# Patient Record
Sex: FEMALE | Race: WHITE | Employment: OTHER | URBAN - METROPOLITAN AREA
[De-identification: names, ages, dates, MRNs, and addresses within clinical notes are randomized per-mention and may not be internally consistent; named-entity substitution may affect disease eponyms.]

---

## 2017-01-03 RX ORDER — POTASSIUM CHLORIDE 750 MG/1
TABLET, EXTENDED RELEASE ORAL
Qty: 36 TABLET | Refills: 3 | Status: SHIPPED | OUTPATIENT
Start: 2017-01-03 | End: 2017-11-01

## 2017-01-03 RX ORDER — ATENOLOL AND CHLORTHALIDONE 100; 25 MG/1; MG/1
TABLET ORAL
Qty: 90 TABLET | Refills: 2 | Status: SHIPPED | OUTPATIENT
Start: 2017-01-03 | End: 2017-09-30

## 2017-01-25 NOTE — TELEPHONE ENCOUNTER
No upcoming appointments   Last HgbA1c on 11 12 2015--7.4  Last refill on Metformin #180 with 2 refills on 12 3 2015

## 2017-02-11 RX ORDER — LEVOTHYROXINE SODIUM 0.05 MG/1
TABLET ORAL
Qty: 90 TABLET | Refills: 3 | Status: SHIPPED | OUTPATIENT
Start: 2017-02-11 | End: 2018-02-02

## 2017-02-24 ENCOUNTER — PATIENT OUTREACH (OUTPATIENT)
Dept: FAMILY MEDICINE CLINIC | Facility: CLINIC | Age: 67
End: 2017-02-24

## 2017-03-14 ENCOUNTER — TELEPHONE (OUTPATIENT)
Dept: FAMILY MEDICINE CLINIC | Facility: CLINIC | Age: 67
End: 2017-03-14

## 2017-03-14 DIAGNOSIS — E03.9 HYPOTHYROIDISM, UNSPECIFIED TYPE: Primary | ICD-10-CM

## 2017-03-14 DIAGNOSIS — I10 ESSENTIAL HYPERTENSION, BENIGN: ICD-10-CM

## 2017-03-14 DIAGNOSIS — R73.9 HYPERGLYCEMIA: ICD-10-CM

## 2017-03-23 NOTE — TELEPHONE ENCOUNTER
Patient requests a 90 day supply on the pen needles   [facility]

## 2017-04-12 ENCOUNTER — OFFICE VISIT (OUTPATIENT)
Dept: NEUROLOGY | Facility: CLINIC | Age: 67
End: 2017-04-12

## 2017-04-12 ENCOUNTER — TELEPHONE (OUTPATIENT)
Dept: FAMILY MEDICINE CLINIC | Facility: CLINIC | Age: 67
End: 2017-04-12

## 2017-04-12 ENCOUNTER — NURSE ONLY (OUTPATIENT)
Dept: FAMILY MEDICINE CLINIC | Facility: CLINIC | Age: 67
End: 2017-04-12

## 2017-04-12 VITALS
BODY MASS INDEX: 47 KG/M2 | RESPIRATION RATE: 18 BRPM | DIASTOLIC BLOOD PRESSURE: 72 MMHG | WEIGHT: 293 LBS | HEART RATE: 86 BPM | SYSTOLIC BLOOD PRESSURE: 116 MMHG

## 2017-04-12 DIAGNOSIS — I10 ESSENTIAL HYPERTENSION, BENIGN: ICD-10-CM

## 2017-04-12 DIAGNOSIS — R42 DISEQUILIBRIUM: ICD-10-CM

## 2017-04-12 DIAGNOSIS — H83.8X3 SUPERIOR SEMICIRCULAR CANAL DEHISCENCE, BILATERAL: ICD-10-CM

## 2017-04-12 DIAGNOSIS — R42 VERTIGO: Primary | ICD-10-CM

## 2017-04-12 DIAGNOSIS — E03.9 HYPOTHYROIDISM, UNSPECIFIED TYPE: ICD-10-CM

## 2017-04-12 DIAGNOSIS — R73.9 HYPERGLYCEMIA: ICD-10-CM

## 2017-04-12 PROCEDURE — 85025 COMPLETE CBC W/AUTO DIFF WBC: CPT | Performed by: FAMILY MEDICINE

## 2017-04-12 PROCEDURE — 84443 ASSAY THYROID STIM HORMONE: CPT | Performed by: FAMILY MEDICINE

## 2017-04-12 PROCEDURE — 80053 COMPREHEN METABOLIC PANEL: CPT | Performed by: FAMILY MEDICINE

## 2017-04-12 PROCEDURE — 99204 OFFICE O/P NEW MOD 45 MIN: CPT | Performed by: OTHER

## 2017-04-12 PROCEDURE — 80061 LIPID PANEL: CPT | Performed by: FAMILY MEDICINE

## 2017-04-12 PROCEDURE — 36415 COLL VENOUS BLD VENIPUNCTURE: CPT | Performed by: FAMILY MEDICINE

## 2017-04-12 PROCEDURE — 82570 ASSAY OF URINE CREATININE: CPT | Performed by: FAMILY MEDICINE

## 2017-04-12 PROCEDURE — 83036 HEMOGLOBIN GLYCOSYLATED A1C: CPT | Performed by: FAMILY MEDICINE

## 2017-04-12 PROCEDURE — 82043 UR ALBUMIN QUANTITATIVE: CPT | Performed by: FAMILY MEDICINE

## 2017-04-12 RX ORDER — DIPHENHYDRAMINE HCL 25 MG
25 CAPSULE ORAL 2 TIMES DAILY
COMMUNITY
End: 2017-11-01

## 2017-04-12 RX ORDER — DOXEPIN HYDROCHLORIDE 50 MG/1
1 CAPSULE ORAL DAILY
COMMUNITY

## 2017-04-12 RX ORDER — ESCITALOPRAM OXALATE 10 MG/1
10 TABLET ORAL DAILY
Qty: 30 TABLET | Refills: 5 | Status: SHIPPED | OUTPATIENT
Start: 2017-04-12 | End: 2017-10-12

## 2017-04-12 RX ORDER — ATENOLOL AND CHLORTHALIDONE 100; 25 MG/1; MG/1
TABLET ORAL
Refills: 2 | COMMUNITY
Start: 2017-04-05 | End: 2017-11-13

## 2017-04-12 RX ORDER — DIAZEPAM 5 MG/1
10 TABLET ORAL EVERY 12 HOURS PRN
COMMUNITY
End: 2017-11-01

## 2017-04-12 RX ORDER — CYCLOBENZAPRINE HCL 10 MG
10 TABLET ORAL EVERY 8 HOURS PRN
COMMUNITY
End: 2017-08-17

## 2017-04-12 RX ORDER — DIAZEPAM 5 MG/1
TABLET ORAL
Qty: 2 TABLET | Refills: 0 | Status: SHIPPED | OUTPATIENT
Start: 2017-04-12 | End: 2017-11-01

## 2017-04-12 RX ORDER — LOPERAMIDE HYDROCHLORIDE 2 MG/1
2 CAPSULE ORAL 4 TIMES DAILY PRN
COMMUNITY
End: 2017-11-01

## 2017-04-12 NOTE — PATIENT INSTRUCTIONS
Refill policies:    • Allow 2 business days for refills; controlled substances may take longer.   • Contact your pharmacy at least 5 days prior to running out of medication and have them send an electronic request or submit request through the “request re insurance carrier to obtain pre-certification or prior authorization. Unfortunately, [facility] has seen an increase in denial of payment even though the procedure/test has been pre-certified.   You are strongly encouraged to contact your insurance carrier to v

## 2017-04-12 NOTE — TELEPHONE ENCOUNTER
Patient would like an order for a mammogram to have done at [facility]. Order in [address] [place] [address] Rd.

## 2017-04-14 ENCOUNTER — TELEPHONE (OUTPATIENT)
Dept: FAMILY MEDICINE CLINIC | Facility: CLINIC | Age: 67
End: 2017-04-14

## 2017-04-14 NOTE — TELEPHONE ENCOUNTER
----- Message from Ananth Vang DO sent at 4/13/2017  8:32 AM CDT -----  Can notify Piedad ayala, (herbie)  that her labs overall are not bad, her BS control is about the same as it was. Her cholesterol liver, kidney and thyroid look great.  She could stand to

## 2017-04-17 NOTE — TELEPHONE ENCOUNTER
Spoke with spouse, the neurologist ordered escitalopram for her. Apparently the med list was updated too. They saw the results and she's concerned re: the WBC. Both parents had some kind of blood disease.  They were also concerned re: the potential SE of th

## 2017-04-19 NOTE — TELEPHONE ENCOUNTER
Of all the medications in that class, escitalopram has the lest side effects and may in fact help her symptoms, the WBC count can be transient depending on several different factors.  But something we should monitor periodically

## 2017-04-19 NOTE — TELEPHONE ENCOUNTER
Patient notified and verbalized understanding. Patient expressing concern about reading ecotrin and escitalopram causing GI bleeding.   Advised patient she can be at an increased risk so to watch for any concerning stomach issues and let Dr Erin Mays know nikki

## 2017-05-09 ENCOUNTER — HOSPITAL ENCOUNTER (OUTPATIENT)
Dept: CT IMAGING | Age: 67
Discharge: HOME | End: 2017-05-09
Attending: Other
Payer: MEDICARE

## 2017-05-09 DIAGNOSIS — H83.8X3 SUPERIOR SEMICIRCULAR CANAL DEHISCENCE, BILATERAL: ICD-10-CM

## 2017-05-09 DIAGNOSIS — R42 VERTIGO: ICD-10-CM

## 2017-05-09 DIAGNOSIS — R42 DISEQUILIBRIUM: ICD-10-CM

## 2017-05-09 PROCEDURE — 70480 CT ORBIT/EAR/FOSSA W/O DYE: CPT | Performed by: OTHER

## 2017-05-10 ENCOUNTER — TELEPHONE (OUTPATIENT)
Dept: NEUROLOGY | Facility: CLINIC | Age: 67
End: 2017-05-10

## 2017-05-10 DIAGNOSIS — R42 DISEQUILIBRIUM: ICD-10-CM

## 2017-05-10 DIAGNOSIS — N95.0 POSTMENOPAUSAL BLEEDING: Primary | ICD-10-CM

## 2017-05-10 DIAGNOSIS — R42 VERTIGO: Primary | ICD-10-CM

## 2017-05-10 RX ORDER — MEDROXYPROGESTERONE ACETATE 10 MG/1
TABLET ORAL
Qty: 30 TABLET | Refills: 3 | Status: SHIPPED | OUTPATIENT
Start: 2017-05-10 | End: 2017-11-03

## 2017-05-10 NOTE — TELEPHONE ENCOUNTER
Spoke with Dr. Elisabet Price and he states MRA head to be without contrast.    Informed radiology and informed them the correct order will be placed into EP[facility].

## 2017-05-11 ENCOUNTER — HOSPITAL ENCOUNTER (OUTPATIENT)
Dept: MRI IMAGING | Age: 67
Discharge: HOME | End: 2017-05-11
Attending: Other
Payer: MEDICARE

## 2017-05-11 DIAGNOSIS — R42 VERTIGO: ICD-10-CM

## 2017-05-11 DIAGNOSIS — R42 DISEQUILIBRIUM: ICD-10-CM

## 2017-05-11 PROCEDURE — 70553 MRI BRAIN STEM W/O & W/DYE: CPT | Performed by: OTHER

## 2017-05-11 PROCEDURE — A9575 INJ GADOTERATE MEGLUMI 0.1ML: HCPCS | Performed by: OTHER

## 2017-05-11 PROCEDURE — 70544 MR ANGIOGRAPHY HEAD W/O DYE: CPT | Performed by: OTHER

## 2017-05-18 NOTE — TELEPHONE ENCOUNTER
Pt states she is out of needles for her insulin pen. Pt uses needles for Novolog and Lantus. She needs rx for at least 6 times a day.

## 2017-06-08 ENCOUNTER — TELEPHONE (OUTPATIENT)
Dept: FAMILY MEDICINE CLINIC | Facility: CLINIC | Age: 67
End: 2017-06-08

## 2017-06-08 NOTE — TELEPHONE ENCOUNTER
ALCIRA Crystal called to discuss medications. Pt is currently in the Medicare medication donut hole. Pt may be able to get her medications cheaper through prior authorization.  Diagnostic codes and previous medications tried provided to insurance for prior au

## 2017-06-09 ENCOUNTER — MED REC SCAN ONLY (OUTPATIENT)
Dept: FAMILY MEDICINE CLINIC | Facility: CLINIC | Age: 67
End: 2017-06-09

## 2017-08-16 ENCOUNTER — PATIENT OUTREACH (OUTPATIENT)
Dept: FAMILY MEDICINE CLINIC | Facility: CLINIC | Age: 67
End: 2017-08-16

## 2017-08-17 ENCOUNTER — TELEPHONE (OUTPATIENT)
Dept: FAMILY MEDICINE CLINIC | Facility: CLINIC | Age: 67
End: 2017-08-17

## 2017-08-17 ENCOUNTER — TELEPHONE (OUTPATIENT)
Dept: SURGERY | Facility: CLINIC | Age: 67
End: 2017-08-17

## 2017-08-17 RX ORDER — CYCLOBENZAPRINE HCL 10 MG
TABLET ORAL
Qty: 20 TABLET | Refills: 1 | Status: SHIPPED | OUTPATIENT
Start: 2017-08-17 | End: 2017-11-01

## 2017-08-17 NOTE — TELEPHONE ENCOUNTER
Detailed message left for pt/ on cell (ok per consent) with instructions to call with questions/concerns.

## 2017-08-17 NOTE — TELEPHONE ENCOUNTER
Informed  we cannot give wife any prescriptions without an evaluation. Patient has not been seen since 3/2015. I recommended he get RX from someone who has been seeing his wife more recently.  Asked if this is a medication the PCP can prescribe, I in

## 2017-08-17 NOTE — TELEPHONE ENCOUNTER
Pt's  called, they are taking a long car trip soon and he was wondering if Dr. Waqar Guerrero could prescribe Cyclobenzaprine, 10 mg tabs for pt for her back for the long trip. Pt had been prescribed this medication from another Dr previously.   [facility]

## 2017-08-18 ENCOUNTER — TELEPHONE (OUTPATIENT)
Dept: FAMILY MEDICINE CLINIC | Facility: CLINIC | Age: 67
End: 2017-08-18

## 2017-08-18 NOTE — TELEPHONE ENCOUNTER
Pharmacist [address] Medley notified and verbalized understanding. Patient notified also. Patient asking what is the concern. Discussed with patient both meds can increase level of serotonin in the blood which can cause dangerously high BP and HR.  Advised patien

## 2017-08-18 NOTE — TELEPHONE ENCOUNTER
Fax from [facility] needing approval to dispense due to taking Escitalopram 10 mg dialy, \"synergistic or additive toxicity with Cyclobenzaprine\"    Routed to Dr Kell Pinzon to advise

## 2017-08-18 NOTE — TELEPHONE ENCOUNTER
Cyclobenzaprine HCl 10 MG Oral Tab 20 tablet 1 8/17/2017     Sig: On po daily prn      [phone] Phone  Jose G in [address] Fax         Spouse ADILSON went to [facility] in [place] and they will not give him the medication due to a possible drug

## 2017-09-30 RX ORDER — ATENOLOL AND CHLORTHALIDONE 100; 25 MG/1; MG/1
TABLET ORAL
Qty: 30 TABLET | Refills: 0 | Status: SHIPPED | OUTPATIENT
Start: 2017-09-30 | End: 2017-10-27

## 2017-10-02 ENCOUNTER — TELEPHONE (OUTPATIENT)
Dept: FAMILY MEDICINE CLINIC | Facility: CLINIC | Age: 67
End: 2017-10-02

## 2017-10-02 NOTE — TELEPHONE ENCOUNTER
Atenolol-Chlorthalidone 100-25 MG Oral Tab needs to be for 90 days. Please sent to [facility] in [place].

## 2017-10-12 RX ORDER — ESCITALOPRAM OXALATE 10 MG/1
10 TABLET ORAL DAILY
Qty: 30 TABLET | Refills: 5 | Status: SHIPPED | OUTPATIENT
Start: 2017-10-12 | End: 2018-04-04

## 2017-10-12 NOTE — TELEPHONE ENCOUNTER
Pt called, needs refill on Escitalopram 10mg tabs, 90 day supply, pt takes one a day. These have been prescribed by another dr in the past but pt wants everything to go through one Dr.-Dr. Alan Gallardo. Svfewixt-Eywvazjwc-Tjatgd.   Please call pt at [phone]

## 2017-10-12 NOTE — TELEPHONE ENCOUNTER
Last OV 11/7/16, Future Appointments  Date Time Provider Lei Martini   11/1/2017 1:15 PM Iftikhar Davison, [facility] Mar[facility]   11/13/2017 1:00 PM Mehrdad Leal, [facility] EM[facility] Tierney Tinoco     Rx not previously prescribed by you.

## 2017-10-19 NOTE — TELEPHONE ENCOUNTER
Last OV 11/7/16, Future Appointments  Date Time Provider Lei Lianet   11/1/2017 1:15 PM Wendy Miguel [facility] EMG Genia Alas   11/13/2017 1:00 PM Autumn Leal, [facility] EM[facility] Genia Alas       Last rx given 4/25/17

## 2017-10-27 RX ORDER — ATENOLOL AND CHLORTHALIDONE 100; 25 MG/1; MG/1
1 TABLET ORAL
Qty: 90 TABLET | Refills: 3 | Status: SHIPPED | OUTPATIENT
Start: 2017-10-27 | End: 2018-10-22

## 2017-10-27 NOTE — TELEPHONE ENCOUNTER
Pt needs a refill of the   ATENOLOL-CHLORTHALIDONE 100-25 MG Oral Tab,   To [facility] in [place], for 90 days.

## 2017-10-27 NOTE — TELEPHONE ENCOUNTER
LOV  04/12/2017  Last refill  09/30/2017   #30 w. 0 RF  Future Appointments  Date Time Provider Lei Martini   11/1/2017 1:15 PM Lisset Bceker DO [facility] EM[facility] Genaro Love   11/13/2017 1:00 PM Hayley Leal, [facility] EM[facility] Genaro Love

## 2017-11-01 ENCOUNTER — OFFICE VISIT (OUTPATIENT)
Dept: FAMILY MEDICINE CLINIC | Facility: CLINIC | Age: 67
End: 2017-11-01

## 2017-11-01 VITALS
BODY MASS INDEX: 45.99 KG/M2 | DIASTOLIC BLOOD PRESSURE: 82 MMHG | HEIGHT: 67 IN | WEIGHT: 293 LBS | SYSTOLIC BLOOD PRESSURE: 120 MMHG | HEART RATE: 84 BPM | TEMPERATURE: 98 F | RESPIRATION RATE: 22 BRPM

## 2017-11-01 DIAGNOSIS — H25.89 OTHER AGE-RELATED CATARACT OF LEFT EYE: Primary | ICD-10-CM

## 2017-11-01 DIAGNOSIS — E03.9 HYPOTHYROIDISM, UNSPECIFIED TYPE: ICD-10-CM

## 2017-11-01 DIAGNOSIS — Z23 NEED FOR VACCINATION: ICD-10-CM

## 2017-11-01 DIAGNOSIS — I10 ESSENTIAL HYPERTENSION, BENIGN: ICD-10-CM

## 2017-11-01 PROCEDURE — G0008 ADMIN INFLUENZA VIRUS VAC: HCPCS | Performed by: FAMILY MEDICINE

## 2017-11-01 PROCEDURE — 90686 IIV4 VACC NO PRSV 0.5 ML IM: CPT | Performed by: FAMILY MEDICINE

## 2017-11-01 PROCEDURE — 99214 OFFICE O/P EST MOD 30 MIN: CPT | Performed by: FAMILY MEDICINE

## 2017-11-01 RX ORDER — KETOROLAC TROMETHAMINE 5 MG/ML
SOLUTION OPHTHALMIC
Refills: 1 | COMMUNITY
Start: 2017-10-04 | End: 2018-11-14

## 2017-11-01 RX ORDER — PREDNISOLONE ACETATE 10 MG/ML
SUSPENSION/ DROPS OPHTHALMIC
Refills: 1 | COMMUNITY
Start: 2017-10-04 | End: 2018-11-14 | Stop reason: ALTCHOICE

## 2017-11-01 RX ORDER — OFLOXACIN 3 MG/ML
SOLUTION/ DROPS OPHTHALMIC 4 TIMES DAILY
COMMUNITY
End: 2018-11-14

## 2017-11-01 NOTE — PROGRESS NOTES
Wilfredo Mane is a 69year old female who presents for a pre-operative physical exam. Patient is to have her right cataract extracted , to be done by Dr. Sobia Fenton at [facility]  on 12/5/17.       HPI:   Pt complains of decreasing vision  in the right e po daily days 1-10 of each month.  Disp: 30 tablet Rfl: 3   Insulin Pen Needle (BD PEN NEEDLE SHORT U/F) 31G X 8 MM Does not apply Misc For use with insulin pens Disp: 600 each Rfl: 3      Allergies:   Latex                      Past Medical History:   Diag stream  MUSCULOSKELETAL: denies back pain  NEURO: denies headaches  PSYCHE: denies depression or anxiety  HEMATOLOGIC: denies hx of anemia  ENDOCRINE: denies thyroid history  ALL/ASTHMA: denies hx of allergy or asthma    EXAM:   /82   Pulse 84   Temp

## 2017-11-03 DIAGNOSIS — E11.9 CONTROLLED TYPE 2 DIABETES MELLITUS WITHOUT COMPLICATION, WITH LONG-TERM CURRENT USE OF INSULIN (HCC): ICD-10-CM

## 2017-11-03 DIAGNOSIS — N95.0 POSTMENOPAUSAL BLEEDING: ICD-10-CM

## 2017-11-03 DIAGNOSIS — Z79.4 CONTROLLED TYPE 2 DIABETES MELLITUS WITHOUT COMPLICATION, WITH LONG-TERM CURRENT USE OF INSULIN (HCC): ICD-10-CM

## 2017-11-03 RX ORDER — INSULIN GLARGINE 100 [IU]/ML
INJECTION, SOLUTION SUBCUTANEOUS
Qty: 17 PEN | Refills: 0 | Status: SHIPPED | OUTPATIENT
Start: 2017-11-03 | End: 2018-11-14

## 2017-11-03 RX ORDER — INSULIN GLARGINE 100 [IU]/ML
INJECTION, SOLUTION SUBCUTANEOUS
Qty: 45 ML | Refills: 0 | Status: CANCELLED | OUTPATIENT
Start: 2017-11-03

## 2017-11-03 RX ORDER — INSULIN ASPART 100 [IU]/ML
INJECTION, SOLUTION INTRAVENOUS; SUBCUTANEOUS
Qty: 75 ML | Refills: 6 | Status: SHIPPED | OUTPATIENT
Start: 2017-11-03 | End: 2019-03-18

## 2017-11-03 RX ORDER — MEDROXYPROGESTERONE ACETATE 10 MG/1
TABLET ORAL
Qty: 30 TABLET | Refills: 3 | Status: SHIPPED | OUTPATIENT
Start: 2017-11-03

## 2017-11-03 NOTE — TELEPHONE ENCOUNTER
I called the Jose G in [facility] because it looks like she has refills of the medroxyprogesterone and per the pharmacist she does have 2 refills left on this medication.       solostar lrf 12/8/16- she needs a 3 month supply to get her through the end of th

## 2017-11-03 NOTE — TELEPHONE ENCOUNTER
PT CALLED TO ADV THAT THERE MUST OF BEEN SOME FORM OF MIX UP W/MED REFILL. THE WRONG INSULIN WAS SENT IN YESTERDAY-PT CALLED AND CANCELLED [address]. PT NEEDS BASAGLAR (PEN FORM)  SEND TO [facility].     PT ADV THAT PT'S INSURANCE WI

## 2017-11-03 NOTE — TELEPHONE ENCOUNTER
Called the pt to clarify what she needs      [employer] is not covering the solostar as of Jan 1 2018, so she spoke with Dr. Doug Vera about this at her visit this week and they decided on Basalgar as the drug that they will change to in 2018.     In the mean

## 2017-11-07 ENCOUNTER — TELEPHONE (OUTPATIENT)
Dept: FAMILY MEDICINE CLINIC | Facility: CLINIC | Age: 67
End: 2017-11-07

## 2017-11-07 NOTE — TELEPHONE ENCOUNTER
Dr. Agusto Lacy office called. They need us to fax the patient's history and physical to the hospital. The fax number is [phone]. They did not receive the signature page last time.

## 2017-11-13 ENCOUNTER — OFFICE VISIT (OUTPATIENT)
Dept: FAMILY MEDICINE CLINIC | Facility: CLINIC | Age: 67
End: 2017-11-13

## 2017-11-13 VITALS
TEMPERATURE: 99 F | WEIGHT: 293 LBS | BODY MASS INDEX: 45.99 KG/M2 | RESPIRATION RATE: 16 BRPM | SYSTOLIC BLOOD PRESSURE: 132 MMHG | DIASTOLIC BLOOD PRESSURE: 80 MMHG | HEIGHT: 67 IN | HEART RATE: 72 BPM | OXYGEN SATURATION: 98 %

## 2017-11-13 DIAGNOSIS — R42 VERTIGO: ICD-10-CM

## 2017-11-13 DIAGNOSIS — M81.0 AGE-RELATED OSTEOPOROSIS WITHOUT CURRENT PATHOLOGICAL FRACTURE: ICD-10-CM

## 2017-11-13 DIAGNOSIS — R42 DISEQUILIBRIUM: ICD-10-CM

## 2017-11-13 DIAGNOSIS — E66.9 DIABETES MELLITUS TYPE 2 IN OBESE (HCC): ICD-10-CM

## 2017-11-13 DIAGNOSIS — Z00.00 ENCOUNTER FOR ANNUAL HEALTH EXAMINATION: ICD-10-CM

## 2017-11-13 DIAGNOSIS — E11.69 DIABETES MELLITUS TYPE 2 IN OBESE (HCC): ICD-10-CM

## 2017-11-13 DIAGNOSIS — E03.9 HYPOTHYROIDISM, UNSPECIFIED TYPE: ICD-10-CM

## 2017-11-13 DIAGNOSIS — Z00.00 ROUTINE GENERAL MEDICAL EXAMINATION AT A HEALTH CARE FACILITY: Primary | ICD-10-CM

## 2017-11-13 DIAGNOSIS — Z12.31 VISIT FOR SCREENING MAMMOGRAM: ICD-10-CM

## 2017-11-13 DIAGNOSIS — I10 ESSENTIAL HYPERTENSION, BENIGN: ICD-10-CM

## 2017-11-13 DIAGNOSIS — E66.01 OBESITY, MORBID, BMI 40.0-49.9 (HCC): ICD-10-CM

## 2017-11-13 PROCEDURE — G0439 PPPS, SUBSEQ VISIT: HCPCS | Performed by: FAMILY MEDICINE

## 2017-11-13 NOTE — PROGRESS NOTES
HPI:   Waqar Queen is a 69year old female who presents for a Medicare Subsequent Annual Wellness visit (Pt already had Initial Annual Wellness).     Ted Huizar is here for follow and medicare wellness, she just had her first cataract done, and it went v LANTUS SOLOSTAR 100 UNIT/ML Subcutaneous Solution Pen-injector Inject 35 units subcutaneous every morning and 20 units in the evening as directed.    MedroxyPROGESTERone Acetate 10 MG Oral Tab Take 1 tab po daily days 1-10 of each month.   ketorolac trome 2/3/2015); and  (Bilateral, 4/14/2015). Her family history includes Carotid stenosis in her mother. SOCIAL HISTORY:   She  reports that she has never smoked.  She has never used smokeless tobacco. She reports that she does not drink alcohol or use drug and tongue normal; teeth and gums normal   Neck: Supple, symmetrical, trachea midline, no adenopathy;  thyroid: not enlarged, symmetric, no tenderness/mass/nodules; no carotid bruit or JVD   Back:   Symmetric, no curvature, ROM normal, no CVA tenderness pathological fracture   CONTINUE VIT D AND CALCIUM CARBONATE  Hypothyroidism, unspecified type   CONTINUE LEVOTHYROXINE DAILY  Obesity, morbid, BMI 40.0-49.9 ([facility] [address]   AGAIN WE DISCUSSED HER DIETARY INDISCRETION AND NEED FOR WEIGT LOSS  Vertigo   SEEING NEUR your medications?: Yes    Hearing Problems?: No     Functional Status     Hearing Problems?: No    Vision Problems? : Yes    Difficulty walking?: No    Difficulty dressing or bathing?: No    Problems with daily activities? : No    Memory Problems?: No years Colonoscopy,10 Years due on 04/20/2020 Update Health Maintenance if applicable    Flex Sigmoidoscopy Screen every 10 years No results found for this or any previous visit. No flowsheet data found.      Fecal Occult Blood Annually No results found for: Zoster  Not covered by Medicare Part B No vaccine history found This may be covered with your pharmacy  prescription benefits        SPECIFIC DISEASE MONITORING Internal Lab or Procedure External Lab or Procedure   Annual Monitoring of Persistent     Medic

## 2017-11-13 NOTE — PATIENT INSTRUCTIONS
Дмитрий Vázquez's SCREENING SCHEDULE   Tests on this list are recommended by your physician but may not be covered, or covered at this frequency, by your insurer. Please check with your insurance carrier before scheduling to verify coverage.    PREVENTAT previous visit.  Limited to patients who meet one of the following criteria:   • Men who are 73-68 years old and have smoked more than 100 cigarettes in their lifetime   • Anyone with a family history    Colorectal Cancer Screening  Covered up to Age 76 Please get this Mammogram regularly   Immunizations      Influenza  Covered Annually   Orders placed or performed in visit on 11/07/16  -FLU VACC PRSV FREE INC ANTIG   Orders placed or performed in visit on 11/12/15  -INFLUENZA VIRUS VACCINE, PRESERV FREE, available on it's website for anyone to review and print using their home computer and printer. (the forms are also available in [address])  [email or web address]. [email or web address]  This link also has information from the [address] regarding Advance Direc

## 2017-11-24 ENCOUNTER — TELEPHONE (OUTPATIENT)
Dept: FAMILY MEDICINE CLINIC | Facility: CLINIC | Age: 67
End: 2017-11-24

## 2017-11-24 NOTE — TELEPHONE ENCOUNTER
Pre-op from 11/2 printed. Need to have DS verify we can use this pre-op again, and sign we will need to fax.

## 2017-11-24 NOTE — TELEPHONE ENCOUNTER
Patient is wondering if the Pre-Op Clearance form has been completed and faxed back to [facility] and the surgeon. Please call patient back with any questions and to advise if form was faxed.

## 2017-11-24 NOTE — TELEPHONE ENCOUNTER
Called and spoke to patient notified her that we did send pre-op clearance on 11-7-2017. Asked patient notified if there is something else that she needs, she states no, she will verify with Dr. You Tirado if everything else is needed and call back if so.

## 2017-11-24 NOTE — TELEPHONE ENCOUNTER
Shayy Townsend from Dr Jovi Hdz' office states that they did not receive the pre-op clearance forms from Dr Ever Figueroa.  Please fax form to fax # [phone]: [place] Acuna

## 2017-11-30 ENCOUNTER — TELEPHONE (OUTPATIENT)
Dept: FAMILY MEDICINE CLINIC | Facility: CLINIC | Age: 67
End: 2017-11-30

## 2017-11-30 NOTE — TELEPHONE ENCOUNTER
Jigar Jessica, RN  Simi Stanford Nurse             Pt had cataract surgery 11/7/17. Pt needs updated H/P for second cataract surgery on 12/5/17. Surgeon is Dr. Candelario Schroeder. Surgery will be at [facility]. H/P to be faxed to Natanael Adorno at [phone].

## 2017-12-04 ENCOUNTER — TELEPHONE (OUTPATIENT)
Dept: FAMILY MEDICINE CLINIC | Facility: CLINIC | Age: 67
End: 2017-12-04

## 2017-12-04 NOTE — TELEPHONE ENCOUNTER
Pt called, is due to have surgery at [facility] tomorrow and was notified by hospital that her pre op is out of date.   Please call pt at [phone]

## 2017-12-04 NOTE — TELEPHONE ENCOUNTER
Spoke with patient who states she was told her H&P is out of date and needs a new preop clearance. 11/1/17 H&P was addended 11/25/17.     Patient provided number for the [facility] [phone] with Leisa Thompson at the [facility] who states [facility]

## 2017-12-05 ENCOUNTER — TELEPHONE (OUTPATIENT)
Dept: FAMILY MEDICINE CLINIC | Facility: CLINIC | Age: 67
End: 2017-12-05

## 2017-12-05 RX ORDER — FLUTICASONE PROPIONATE AND SALMETEROL 50; 100 UG/1; UG/1
POWDER RESPIRATORY (INHALATION)
Qty: 3 EACH | Refills: 3 | Status: SHIPPED | OUTPATIENT
Start: 2017-12-05 | End: 2018-12-06

## 2017-12-05 RX ORDER — RAMIPRIL 2.5 MG/1
CAPSULE ORAL
Qty: 90 CAPSULE | Refills: 1 | Status: SHIPPED | OUTPATIENT
Start: 2017-12-05 | End: 2018-06-06

## 2017-12-05 RX ORDER — POTASSIUM CHLORIDE 750 MG/1
TABLET, EXTENDED RELEASE ORAL
Qty: 36 TABLET | Refills: 3 | Status: SHIPPED | OUTPATIENT
Start: 2017-12-05 | End: 2018-11-14

## 2017-12-05 NOTE — TELEPHONE ENCOUNTER
Pt needs refill of Advair diskus 100/50; potassium chloride 100 MEQ; ramopril 2.5 mg  90 days to [facility].  Pls call if ?

## 2017-12-05 NOTE — TELEPHONE ENCOUNTER
Last refills -   Advair - 12/8/16 - #3 with 3 refills  KCL - 1/3/17 - #36 with 3 refills  Ramipril - 12/18/16 - #90 with 3 refills  Last b/p - 11/13/17 - 132/80

## 2017-12-05 NOTE — TELEPHONE ENCOUNTER
Last refills -   Advair - 12/8/16 - #3 with 3 refills  KCL - 1/3/17 - #36 with 3 refills  Ramipril - 12/18/16 - #90 with 3 refills  Last b/p - 11/13/17 - 132/80    See refill request encounter

## 2017-12-20 RX ORDER — POTASSIUM CHLORIDE 750 MG/1
TABLET, EXTENDED RELEASE ORAL
Qty: 36 TABLET | Refills: 6 | Status: SHIPPED | OUTPATIENT
Start: 2017-12-20 | End: 2019-02-25

## 2017-12-20 NOTE — TELEPHONE ENCOUNTER
LOV: 11/13/17  Last Refill:12/5/17  #36 3 RF    Last CMP: 4/12/17      No future appointments.     Leydi Vásquez, 12/20/17, 3:34 PM

## 2018-02-02 RX ORDER — LEVOTHYROXINE SODIUM 0.05 MG/1
TABLET ORAL
Qty: 90 TABLET | Refills: 3 | Status: SHIPPED | OUTPATIENT
Start: 2018-02-02 | End: 2019-01-28

## 2018-02-02 RX ORDER — INSULIN GLARGINE 100 [IU]/ML
INJECTION, SOLUTION SUBCUTANEOUS
Qty: 17 PEN | Refills: 3 | Status: SHIPPED | OUTPATIENT
Start: 2018-02-02 | End: 2018-10-22

## 2018-02-02 NOTE — TELEPHONE ENCOUNTER
Pt's Lantus is not covered with pt's insurance,   Pt's spouse thinks the [place] is covered. Needs a refill sent to [facility] [place].      Please return call to spouse at [phone]

## 2018-02-02 NOTE — TELEPHONE ENCOUNTER
LOV: 11/13/17  Last Refill:11/3/17  #17 0 RF    Kwame pended for your approval.  Spouse is requesting 90 day supply    Spouse mentioned that with [employer] [facility] [facility]- [facility] [address] [address] and spouse states he can't drive

## 2018-03-09 ENCOUNTER — TELEPHONE (OUTPATIENT)
Dept: FAMILY MEDICINE CLINIC | Facility: CLINIC | Age: 68
End: 2018-03-09

## 2018-04-04 RX ORDER — ESCITALOPRAM OXALATE 10 MG/1
TABLET ORAL
Qty: 30 TABLET | Refills: 5 | Status: SHIPPED | OUTPATIENT
Start: 2018-04-04 | End: 2018-04-05

## 2018-04-04 NOTE — TELEPHONE ENCOUNTER
Metformin Last refilled on 10/19/17 for # 180 with 1 refills  escitalopram last refilled on 10/12/17 for # 30 with 5 refills    Last A1C labs 7.5 on 4/12/17  Last seen on 11/13/17  No future appointments. Thank you.

## 2018-04-05 RX ORDER — ESCITALOPRAM OXALATE 10 MG/1
TABLET ORAL
Qty: 90 TABLET | Refills: 3 | Status: SHIPPED | OUTPATIENT
Start: 2018-04-05 | End: 2019-04-05

## 2018-04-05 NOTE — TELEPHONE ENCOUNTER
Patient requesting 90 day supply. Last refilled on 4/4/18 for # 30 with 5 refills  Last TSH labs 2.030 on 4/12/17  Last seen on 11/13/17  No future appointments. Advise. Thank you.

## 2018-05-03 ENCOUNTER — TELEPHONE (OUTPATIENT)
Dept: FAMILY MEDICINE CLINIC | Facility: CLINIC | Age: 68
End: 2018-05-03

## 2018-05-08 ENCOUNTER — MED REC SCAN ONLY (OUTPATIENT)
Dept: FAMILY MEDICINE CLINIC | Facility: CLINIC | Age: 68
End: 2018-05-08

## 2018-06-06 RX ORDER — RAMIPRIL 2.5 MG/1
CAPSULE ORAL
Qty: 90 CAPSULE | Refills: 3 | Status: SHIPPED | OUTPATIENT
Start: 2018-06-06 | End: 2019-06-17

## 2018-06-06 NOTE — TELEPHONE ENCOUNTER
Pt called and needs this script to be filled in [place]. She is visiting family out of town.   90 day supply    [facility]    Please return call to [phone]

## 2018-06-06 NOTE — TELEPHONE ENCOUNTER
LOV: 11/13/17  Last Refill:  12/5/17  #90 1 RF    Future Appointments  Date Time Provider Lei Martini   11/14/2018 9:15 AM Sikic, Sina Romberg, DO [facility] Zahida Valencia

## 2018-07-13 RX ORDER — PEN NEEDLE, DIABETIC 31 GX5/16"
NEEDLE, DISPOSABLE MISCELLANEOUS
Qty: 600 EACH | Refills: 3 | Status: SHIPPED | OUTPATIENT
Start: 2018-07-13 | End: 2019-08-26

## 2018-07-13 NOTE — TELEPHONE ENCOUNTER
Last refilled on 5/18/17 for # 600 with 3 refills  Last A1C 7.5 on 4/12/17  Last seen on 11/13/17  Future Appointments  Date Time Provider Lei Martini   11/14/2018 9:15 AM [facility] [place]        Thank you.

## 2018-09-06 ENCOUNTER — TELEPHONE (OUTPATIENT)
Dept: FAMILY MEDICINE CLINIC | Facility: CLINIC | Age: 68
End: 2018-09-06

## 2018-09-06 NOTE — TELEPHONE ENCOUNTER
Pt was in office today with  for f/u visit. Pt states she would like to do cologuard testing. Pt filled out and signed order form and it was sent to [facility].

## 2018-10-01 ENCOUNTER — IMMUNIZATION (OUTPATIENT)
Dept: FAMILY MEDICINE CLINIC | Facility: CLINIC | Age: 68
End: 2018-10-01
Payer: MEDICARE

## 2018-10-01 PROCEDURE — 90653 IIV ADJUVANT VACCINE IM: CPT | Performed by: FAMILY MEDICINE

## 2018-10-01 PROCEDURE — G0008 ADMIN INFLUENZA VIRUS VAC: HCPCS | Performed by: FAMILY MEDICINE

## 2018-10-09 DIAGNOSIS — N95.0 POSTMENOPAUSAL BLEEDING: ICD-10-CM

## 2018-10-09 RX ORDER — ESCITALOPRAM OXALATE 10 MG/1
TABLET ORAL
Qty: 90 TABLET | Refills: 1 | Status: SHIPPED | OUTPATIENT
Start: 2018-10-09 | End: 2019-04-05

## 2018-10-09 RX ORDER — MEDROXYPROGESTERONE ACETATE 10 MG/1
TABLET ORAL
Qty: 90 TABLET | Refills: 0 | Status: SHIPPED | OUTPATIENT
Start: 2018-10-09 | End: 2018-11-14

## 2018-10-09 NOTE — TELEPHONE ENCOUNTER
Patient called to make sure we received this prescription request. She wants 90 days for both prescriptions.

## 2018-10-09 NOTE — TELEPHONE ENCOUNTER
LOV: 11/13/17  Last Refill:   Escitalopram  #90 3 RF  Medroxyprogesterone  #30 3 RF    Future Appointments   Date Time Provider Lei Martini   11/14/2018  9:15 AM Maribel Leal DO [facility] Rudy Auguste

## 2018-10-18 ENCOUNTER — TELEPHONE (OUTPATIENT)
Dept: FAMILY MEDICINE CLINIC | Facility: CLINIC | Age: 68
End: 2018-10-18

## 2018-10-18 NOTE — TELEPHONE ENCOUNTER
Last refill on Metformin #180 with 1 refill on 4 4 2018   Last HgbA1c on 4 12 2017 7.5  Appointment on 11 14 2018

## 2018-10-18 NOTE — TELEPHONE ENCOUNTER
METFORMIN HCL 1000 MG Oral Tab please fill for 90 days.     [facility] [address] [facility], [phone], [phone]

## 2018-10-22 RX ORDER — ATENOLOL AND CHLORTHALIDONE 100; 25 MG/1; MG/1
TABLET ORAL
Qty: 90 TABLET | Refills: 3 | Status: SHIPPED | OUTPATIENT
Start: 2018-10-22 | End: 2019-10-22

## 2018-10-22 RX ORDER — INSULIN GLARGINE 100 [IU]/ML
INJECTION, SOLUTION SUBCUTANEOUS
Qty: 17 PEN | Refills: 3 | Status: SHIPPED | OUTPATIENT
Start: 2018-10-22 | End: 2019-01-14

## 2018-10-22 NOTE — TELEPHONE ENCOUNTER
Last refilled on 10/27/17 for # 90 with 3 refills  Last seen on 11/13/17, /80  Future Appointments   Date Time Provider Lei Martini   11/14/2018  9:15 AM Clayton Jurado [facility] BELINDA Ta        Thank you.

## 2018-10-22 NOTE — TELEPHONE ENCOUNTER
Last refilled on 2/2/18 for # 17 with 3 refills  Last A1C 7.5 on 4/12/17  Last seen on 11/7/17  Future Appointments   Date Time Provider Lei Martini   11/14/2018  9:15 AM DO LAUREN Wilkinson        Thank you.

## 2018-11-03 ENCOUNTER — TELEPHONE (OUTPATIENT)
Dept: FAMILY MEDICINE CLINIC | Facility: CLINIC | Age: 68
End: 2018-11-03

## 2018-11-14 ENCOUNTER — OFFICE VISIT (OUTPATIENT)
Dept: FAMILY MEDICINE CLINIC | Facility: CLINIC | Age: 68
End: 2018-11-14
Payer: MEDICARE

## 2018-11-14 VITALS
RESPIRATION RATE: 20 BRPM | WEIGHT: 288.38 LBS | HEART RATE: 64 BPM | HEIGHT: 67 IN | DIASTOLIC BLOOD PRESSURE: 68 MMHG | BODY MASS INDEX: 45.26 KG/M2 | SYSTOLIC BLOOD PRESSURE: 116 MMHG | TEMPERATURE: 97 F

## 2018-11-14 DIAGNOSIS — E03.9 HYPOTHYROIDISM, UNSPECIFIED TYPE: ICD-10-CM

## 2018-11-14 DIAGNOSIS — E66.9 DIABETES MELLITUS TYPE 2 IN OBESE (HCC): ICD-10-CM

## 2018-11-14 DIAGNOSIS — E11.69 DIABETES MELLITUS TYPE 2 IN OBESE (HCC): ICD-10-CM

## 2018-11-14 DIAGNOSIS — R42 VERTIGO: ICD-10-CM

## 2018-11-14 DIAGNOSIS — I10 ESSENTIAL HYPERTENSION, BENIGN: ICD-10-CM

## 2018-11-14 DIAGNOSIS — Z00.00 MEDICARE ANNUAL WELLNESS VISIT, SUBSEQUENT: Primary | ICD-10-CM

## 2018-11-14 DIAGNOSIS — Z00.00 ENCOUNTER FOR ANNUAL HEALTH EXAMINATION: ICD-10-CM

## 2018-11-14 DIAGNOSIS — Z12.31 SCREENING MAMMOGRAM, ENCOUNTER FOR: ICD-10-CM

## 2018-11-14 DIAGNOSIS — M81.0 AGE-RELATED OSTEOPOROSIS WITHOUT CURRENT PATHOLOGICAL FRACTURE: ICD-10-CM

## 2018-11-14 DIAGNOSIS — E66.01 MORBID OBESITY WITH BMI OF 45.0-49.9, ADULT (HCC): ICD-10-CM

## 2018-11-14 PROCEDURE — 85025 COMPLETE CBC W/AUTO DIFF WBC: CPT | Performed by: FAMILY MEDICINE

## 2018-11-14 PROCEDURE — 84439 ASSAY OF FREE THYROXINE: CPT | Performed by: FAMILY MEDICINE

## 2018-11-14 PROCEDURE — 36415 COLL VENOUS BLD VENIPUNCTURE: CPT | Performed by: FAMILY MEDICINE

## 2018-11-14 PROCEDURE — 80061 LIPID PANEL: CPT | Performed by: FAMILY MEDICINE

## 2018-11-14 PROCEDURE — 83036 HEMOGLOBIN GLYCOSYLATED A1C: CPT | Performed by: FAMILY MEDICINE

## 2018-11-14 PROCEDURE — 84443 ASSAY THYROID STIM HORMONE: CPT | Performed by: FAMILY MEDICINE

## 2018-11-14 PROCEDURE — G0439 PPPS, SUBSEQ VISIT: HCPCS | Performed by: FAMILY MEDICINE

## 2018-11-14 PROCEDURE — 80053 COMPREHEN METABOLIC PANEL: CPT | Performed by: FAMILY MEDICINE

## 2018-11-14 PROCEDURE — 82570 ASSAY OF URINE CREATININE: CPT | Performed by: FAMILY MEDICINE

## 2018-11-14 PROCEDURE — 82043 UR ALBUMIN QUANTITATIVE: CPT | Performed by: FAMILY MEDICINE

## 2018-11-14 RX ORDER — FLUTICASONE PROPIONATE 50 MCG
SPRAY, SUSPENSION (ML) NASAL NIGHTLY
COMMUNITY
End: 2018-12-01

## 2018-11-14 NOTE — PROGRESS NOTES
HPI:   Waqar Queen is a 77year old female who presents for a Medicare Subsequent Annual Wellness visit (Pt already had Initial Annual Wellness).     Beulah Dakota is here for her annual exam, she has lost weight since she was  here last she  Has some issu Health Care on file in [place]. She has never smoked tobacco.    CAGE Alcohol screening   Wilfredo Mane was screened for Alcohol abuse and had a score of 0 so is at low risk.     Patient Care Team: Patient Care Team:  Carlitos Viramontes DO as PCP - General TABLET BY MOUTH THREE TIMES WEEKLY   ADVAIR DISKUS 100-50 MCG/DOSE Inhalation Aerosol Powder, Breath Activated INHALE 1 PUFF BY MOUTH TWICE DAILY   NOVOLOG FLEXPEN 100 UNIT/ML Subcutaneous Solution Pen-injector INJECT 14 TO 17 UNITS AT BREAKFAST, 14 TO 17 Hearing Assessment  (Required for AWV/SWV)    Whispered Voice       Visual Acuity  Right Eye Visual Acuity: Corrected Right Eye Chart Acuity: 20/20   Left Eye Visual Acuity: Corrected Left Eye Chart Acuity: 20/20   Both Eyes Visual Acuity: Corrected Both E older 0.5 ml Prefilled syringe (29521) 11/01/2017   • HIGH DOSE FLU 65 YRS AND OLDER PRSV FREE SINGLE D (36138) FLU CLINIC 11/07/2016   • Influenza 10/02/2013   • Pneumococcal (Prevnar 13) 11/07/2016   • Pneumovax 23 03/01/2011   • TDAP 03/01/2011        A exercise. Return in 6 months (on 5/14/2019).      Chen Sanchez DO, 11/14/2018     General Health     In the past six months, have you lost more than 10 pounds without trying?: 1 - Yes  Has your appetite been poor?: No  How does the patient maintain a go age 72 and older at high risk There are no preventive care reminders to display for this patient. Update Health Maintenance if applicable    Chlamydia  Annually if high risk No results found for: CHLAMYDIA No flowsheet data found.     Screening Mammogram results found for: DIGOXIN, DIG, VALP No flowsheet data found. Diabetes      HgbA1C  Annually HGBA1C (%)   Date Value   06/06/2014 6.9 (H)     HgbA1C (%)   Date Value   04/12/2017 7.5 (H)       No flowsheet data found.     Creat/alb ratio  Annually Ma

## 2018-11-14 NOTE — PATIENT INSTRUCTIONS
Mahamed Vázquez's SCREENING SCHEDULE   Tests on this list are recommended by your physician but may not be covered, or covered at this frequency, by your insurer. Please check with your insurance carrier before scheduling to verify coverage.    KAMARI patients who meet one of the following criteria:   • Men who are 73-68 years old and have smoked more than 100 cigarettes in their lifetime   • Anyone with a family history    Colorectal Cancer Screening  Covered up to Age 76     Colonoscopy Screen   Cover Immunizations      Influenza  Covered Annually Orders placed or performed in visit on 11/07/16   • FLU VACC [address] ANTIG   Orders placed or performed in visit on 11/12/15   • INFLUENZA VIRUS VACCINE, PRESERV FREE, >=1YEARS OF AGE   Orders placed anyone to review and print using their home computer and printer. (the forms are also available in [address])  [email or web address]  This link also has information from the [address] [place] [address] regarding Advance Directives.

## 2018-11-15 ENCOUNTER — TELEPHONE (OUTPATIENT)
Dept: FAMILY MEDICINE CLINIC | Facility: CLINIC | Age: 68
End: 2018-11-15

## 2018-11-15 DIAGNOSIS — E11.69 DIABETES MELLITUS TYPE 2 IN OBESE (HCC): Primary | ICD-10-CM

## 2018-11-15 DIAGNOSIS — E66.9 DIABETES MELLITUS TYPE 2 IN OBESE (HCC): Primary | ICD-10-CM

## 2018-11-15 NOTE — TELEPHONE ENCOUNTER
----- Message from Kailey Nicole DO sent at 11/15/2018  9:59 AM CST -----  Notify Nuha Hopes  labs looked very good lipids were  Excellent kidney, liver function, blood sugar  Is actually better as well, so nice job , and lets see if maybe we can cut

## 2018-11-19 ENCOUNTER — TELEPHONE (OUTPATIENT)
Dept: FAMILY MEDICINE CLINIC | Facility: CLINIC | Age: 68
End: 2018-11-19

## 2018-11-19 NOTE — TELEPHONE ENCOUNTER
[facility].    Order form and letter sent to scanning.

## 2018-11-19 NOTE — TELEPHONE ENCOUNTER
Pt was advised of results- verbalized understanding    Pt states she noticed her white count was slightly high.  PT states her dad  of complications d/t a blood condition where his white count \"went out of control\"    Pt states she would like repeat c

## 2018-12-01 RX ORDER — FLUTICASONE PROPIONATE 50 MCG
1 SPRAY, SUSPENSION (ML) NASAL NIGHTLY
Qty: 1 BOTTLE | Refills: 0 | Status: SHIPPED | OUTPATIENT
Start: 2018-12-01 | End: 2019-03-11

## 2018-12-01 RX ORDER — FLUTICASONE PROPIONATE 50 MCG
SPRAY, SUSPENSION (ML) NASAL
Refills: 0 | OUTPATIENT
Start: 2018-12-01

## 2018-12-06 RX ORDER — FLUTICASONE PROPIONATE AND SALMETEROL 100; 50 UG/1; UG/1
POWDER RESPIRATORY (INHALATION)
Qty: 1 EACH | Refills: 3 | Status: SHIPPED | OUTPATIENT
Start: 2018-12-06 | End: 2019-03-11

## 2018-12-06 NOTE — TELEPHONE ENCOUNTER
PT ADV NEEDS REFILL OF     ADVAIR DISKUS 100-50 MCG/DOSE Inhalation Aerosol     (PT ADV SHE IS IN THE DONUT HOLE)    PT ONLY NEEDS THIS MONTH ONLY     [facility]    THANK YOU    (DO WE HAVE ANY SAMPLES)

## 2018-12-17 ENCOUNTER — PATIENT OUTREACH (OUTPATIENT)
Dept: FAMILY MEDICINE CLINIC | Facility: CLINIC | Age: 68
End: 2018-12-17

## 2019-01-14 RX ORDER — INSULIN GLARGINE 100 [IU]/ML
INJECTION, SOLUTION SUBCUTANEOUS
Qty: 18 PEN | Refills: 3 | Status: SHIPPED | OUTPATIENT
Start: 2019-01-14 | End: 2019-12-10

## 2019-01-14 NOTE — TELEPHONE ENCOUNTER
BASAGLAR KWIKPEN 100 UNIT/ML Subcutaneous Solution Pen-injector 17 pen 3 10/22/2018    Sig :  INJECT 35 UNITS UNDER THE SKIN EVERY MORNING AND 20 UNITS UNDER THE SKIN EVERY EVENING AS DIRECTED       PATIENT WOULD LIKE A 90 DAY SUPPLY.      Ady Mae IN [place]

## 2019-01-28 RX ORDER — LEVOTHYROXINE SODIUM 0.05 MG/1
TABLET ORAL
Qty: 90 TABLET | Refills: 3 | Status: SHIPPED | OUTPATIENT
Start: 2019-01-28 | End: 2020-01-17

## 2019-01-28 NOTE — TELEPHONE ENCOUNTER
Patient's  called about this request. He wants to make sure we fill it 90 days at a time. Please call back when meds have been called in.

## 2019-01-28 NOTE — TELEPHONE ENCOUNTER
LOV: 11/14/18   Last Refill:' 2/2/18 #90 3 RF    Last Labs: 11/14/18  Free T4 1.0  TSH 1.940    No future appointments.

## 2019-02-25 ENCOUNTER — TELEPHONE (OUTPATIENT)
Dept: FAMILY MEDICINE CLINIC | Facility: CLINIC | Age: 69
End: 2019-02-25

## 2019-02-25 RX ORDER — POTASSIUM CHLORIDE 750 MG/1
TABLET, EXTENDED RELEASE ORAL
Qty: 90 TABLET | Refills: 2 | Status: SHIPPED | OUTPATIENT
Start: 2019-02-25 | End: 2021-12-09

## 2019-02-25 NOTE — TELEPHONE ENCOUNTER
Spoke with spouse and advised that DS did fill script and it was sent ot pharmacy. E confirmation received at 2:13pm    Spouse was advised to contact pharmacy and make sure that they received script.   Verbalized understanding

## 2019-02-25 NOTE — TELEPHONE ENCOUNTER
LOV: 11/14/18  Last Refill:12/2017  #36 6 RF    Pt requests 90 day supply    No future appointments.

## 2019-02-25 NOTE — TELEPHONE ENCOUNTER
Pt's spouse called saying he went to  script for pt and Jose G advised that MD had denied the refill. Miri Gaytan a bit put out that this happened, said pt has been taking this for years.   Pls check and call Miri Gaytan at number given

## 2019-03-11 RX ORDER — FLUTICASONE PROPIONATE 50 MCG
SPRAY, SUSPENSION (ML) NASAL
Qty: 1 BOTTLE | Refills: 2 | Status: SHIPPED | OUTPATIENT
Start: 2019-03-11

## 2019-03-11 RX ORDER — FLUTICASONE PROPIONATE AND SALMETEROL 100; 50 UG/1; UG/1
POWDER RESPIRATORY (INHALATION)
Qty: 1 PACKAGE | Refills: 0 | Status: SHIPPED | OUTPATIENT
Start: 2019-03-11 | End: 2019-04-08

## 2019-03-11 RX ORDER — ALBUTEROL SULFATE 90 UG/1
2 AEROSOL, METERED RESPIRATORY (INHALATION) EVERY 6 HOURS PRN
Qty: 3 INHALER | Refills: 3 | Status: SHIPPED | OUTPATIENT
Start: 2019-03-11 | End: 2021-12-09

## 2019-03-11 NOTE — TELEPHONE ENCOUNTER
ALBUTEROL INHALER - ALBUTEROL SULFATE (PRO-AIR)    Fluticasone Propionate 50 MCG/ACT Nasal Suspension 1 Bottle 0 12/1/2018    Sig :  1 spray by Each Nare route nightly.      Route:   Each Nare                     fluticasone-salmeterol (ADVAIR DISKUS) 100-5

## 2019-03-11 NOTE — TELEPHONE ENCOUNTER
Advair and Fluticasone RF today    Albuterol Sulfate  LOV: 12/6/18   Last Refill:12/3/15 1 inhaler 6 Rf    No future appointments.

## 2019-03-13 RX ORDER — FLUTICASONE PROPIONATE AND SALMETEROL 100; 50 UG/1; UG/1
POWDER RESPIRATORY (INHALATION)
Qty: 1 EACH | Refills: 5 | Status: SHIPPED | OUTPATIENT
Start: 2019-03-13 | End: 2019-04-08

## 2019-03-18 DIAGNOSIS — E66.01 MORBID OBESITY WITH BMI OF 45.0-49.9, ADULT (HCC): ICD-10-CM

## 2019-03-18 DIAGNOSIS — E66.9 DIABETES MELLITUS TYPE 2 IN OBESE (HCC): ICD-10-CM

## 2019-03-18 DIAGNOSIS — I10 ESSENTIAL HYPERTENSION, BENIGN: Primary | ICD-10-CM

## 2019-03-18 DIAGNOSIS — E11.69 DIABETES MELLITUS TYPE 2 IN OBESE (HCC): ICD-10-CM

## 2019-03-18 NOTE — TELEPHONE ENCOUNTER
LOV: 11/14/18  Last Refill: 11/3/17 75ml 6 RF    No future appointments.     Last Labs: 11/14/18  6.9

## 2019-03-18 NOTE — TELEPHONE ENCOUNTER
Last refilled on 10/18/18 for # 180 with 0 refills  Last A1C 6.9 on 11/14/18  Last OV 11/14/18  No future appointments. Thank you.

## 2019-03-18 NOTE — TELEPHONE ENCOUNTER
Future orders placed with recall      PT advised    Future Appointments   Date Time Provider Lei Martini   5/6/2019 10:00 AM  [facility] [address] [facility] EM[facility] Elly Seg[place]

## 2019-03-18 NOTE — TELEPHONE ENCOUNTER
Pt needs a refill of the  NOVOLOG FLEXPEN 100 UNIT/ML Subcutaneous Solution Pen-injector  90 Days  [facility]

## 2019-04-05 RX ORDER — ESCITALOPRAM OXALATE 10 MG/1
TABLET ORAL
Qty: 90 TABLET | Refills: 3 | Status: SHIPPED | OUTPATIENT
Start: 2019-04-05 | End: 2020-03-28

## 2019-04-05 RX ORDER — ESCITALOPRAM OXALATE 10 MG/1
TABLET ORAL
Qty: 90 TABLET | Refills: 1 | Status: SHIPPED | OUTPATIENT
Start: 2019-04-05 | End: 2019-11-20

## 2019-04-05 NOTE — TELEPHONE ENCOUNTER
Last refilled on 4/5/18 for # 90 with 3 refills  Last OV 11/14/18  Future Appointments   Date Time Provider Lei Martini   5/6/2019 10:00 AM  [facility] [place] Fabian Hagen        Thank you.

## 2019-04-05 NOTE — TELEPHONE ENCOUNTER
Last refilled on 10/9/18 for # 90 with 1 refills  Last OV 11/14/18  Future Appointments   Date Time Provider Lei Martini   5/6/2019 10:00 AM  [facility] [address] EMG Genaro Love        Thank you.

## 2019-04-05 NOTE — TELEPHONE ENCOUNTER
Pt needs a refill of the  ESCITALOPRAM 10 MG Oral Tab [[id number]]    90 Days  [facility]

## 2019-04-08 RX ORDER — FLUTICASONE PROPIONATE AND SALMETEROL 100; 50 UG/1; UG/1
POWDER RESPIRATORY (INHALATION)
Qty: 14 EACH | Refills: 3 | Status: SHIPPED | OUTPATIENT
Start: 2019-04-08 | End: 2019-11-20

## 2019-04-08 NOTE — TELEPHONE ENCOUNTER
LOV: 11/14/18   Last Refill: 12/5/17 of Advair    Refill is generic Advair    Future Appointments   Date Time Provider Lei Martini   5/6/2019 10:00 AM  [facility] [place] [place]

## 2019-04-11 ENCOUNTER — TELEPHONE (OUTPATIENT)
Dept: FAMILY MEDICINE CLINIC | Facility: CLINIC | Age: 69
End: 2019-04-11

## 2019-04-24 ENCOUNTER — TELEPHONE (OUTPATIENT)
Dept: FAMILY MEDICINE CLINIC | Facility: CLINIC | Age: 69
End: 2019-04-24

## 2019-05-09 ENCOUNTER — TELEPHONE (OUTPATIENT)
Dept: FAMILY MEDICINE CLINIC | Facility: CLINIC | Age: 69
End: 2019-05-09

## 2019-05-09 NOTE — TELEPHONE ENCOUNTER
Letter mailed to patient reminding her she is due for labs.     Lab Frequency Next Occurrence   HEMOGLOBIN A1C Once 03/19/2019   MICROALB/CREAT RATIO, RANDOM URINE Once 70/98/1011   COMP METABOLIC PANEL (14) Once 55/99/6570   LIPID PANEL Once 05/18/2019

## 2019-05-15 RX ORDER — CYCLOBENZAPRINE HCL 10 MG
TABLET ORAL
Qty: 20 TABLET | Refills: 1 | Status: SHIPPED | OUTPATIENT
Start: 2019-05-15 | End: 2021-12-09

## 2019-05-15 NOTE — TELEPHONE ENCOUNTER
LOV: 11/14/18  Last Refill:8/17/17 #20 1 RF    Future Appointments   Date Time Provider Lei Martini   11/18/2019 10:00 AM Taty Leal [facility] Brittanie Gillespie

## 2019-05-15 NOTE — TELEPHONE ENCOUNTER
Spouse called, pt needs refill on Cyclobenzaprine, 10 mg. Hale Sandborn.  Please call spouse at [phone]

## 2019-06-17 ENCOUNTER — TELEPHONE (OUTPATIENT)
Dept: FAMILY MEDICINE CLINIC | Facility: CLINIC | Age: 69
End: 2019-06-17

## 2019-06-17 RX ORDER — RAMIPRIL 2.5 MG/1
CAPSULE ORAL
Qty: 90 CAPSULE | Refills: 3 | Status: SHIPPED | OUTPATIENT
Start: 2019-06-17 | End: 2020-03-10

## 2019-06-17 NOTE — TELEPHONE ENCOUNTER
Last refilled on 6/6/18 for # 90 with 3 rf. Last labs 11/14/18. Last seen on 11/14/18.    BP Readings from Last 3 Encounters:  11/14/18 : 116/68  11/13/17 : 132/80  11/01/17 : 120/82     Future Appointments   Date Time Provider Lei Martini   11/18/2

## 2019-08-26 RX ORDER — PEN NEEDLE, DIABETIC 31 GX5/16"
NEEDLE, DISPOSABLE MISCELLANEOUS
Qty: 600 EACH | Refills: 3 | Status: SHIPPED | OUTPATIENT
Start: 2019-08-26 | End: 2020-04-23

## 2019-08-26 NOTE — TELEPHONE ENCOUNTER
Last refilled on 7/13/18 for # 600 with 3 refills  Last OV 11/14/18  Future Appointments   Date Time Provider Lei Martini   9/4/2019 11:15 AM Herbert Chen DO [facility] EM[facility] [place]   11/18/2019 10:00 AM Charles Leal DO EMG [facility] Dinorah Salter        Thank

## 2019-09-04 ENCOUNTER — HOSPITAL ENCOUNTER (OUTPATIENT)
Dept: GENERAL RADIOLOGY | Age: 69
Discharge: HOME | End: 2019-09-04
Attending: FAMILY MEDICINE
Payer: MEDICARE

## 2019-09-04 ENCOUNTER — OFFICE VISIT (OUTPATIENT)
Dept: FAMILY MEDICINE CLINIC | Facility: CLINIC | Age: 69
End: 2019-09-04
Payer: MEDICARE

## 2019-09-04 VITALS
HEIGHT: 67 IN | RESPIRATION RATE: 20 BRPM | WEIGHT: 293 LBS | SYSTOLIC BLOOD PRESSURE: 118 MMHG | HEART RATE: 68 BPM | BODY MASS INDEX: 45.99 KG/M2 | TEMPERATURE: 98 F | DIASTOLIC BLOOD PRESSURE: 68 MMHG

## 2019-09-04 DIAGNOSIS — I10 ESSENTIAL HYPERTENSION, BENIGN: ICD-10-CM

## 2019-09-04 DIAGNOSIS — E66.9 DIABETES MELLITUS TYPE 2 IN OBESE (HCC): ICD-10-CM

## 2019-09-04 DIAGNOSIS — M48.061 SPINAL STENOSIS OF LUMBAR REGION, UNSPECIFIED WHETHER NEUROGENIC CLAUDICATION PRESENT: ICD-10-CM

## 2019-09-04 DIAGNOSIS — M54.41 CHRONIC BILATERAL LOW BACK PAIN WITH BILATERAL SCIATICA: ICD-10-CM

## 2019-09-04 DIAGNOSIS — M48.061 SPINAL STENOSIS OF LUMBAR REGION, UNSPECIFIED WHETHER NEUROGENIC CLAUDICATION PRESENT: Primary | ICD-10-CM

## 2019-09-04 DIAGNOSIS — E03.9 HYPOTHYROIDISM, UNSPECIFIED TYPE: ICD-10-CM

## 2019-09-04 DIAGNOSIS — E11.69 DIABETES MELLITUS TYPE 2 IN OBESE (HCC): ICD-10-CM

## 2019-09-04 DIAGNOSIS — M54.42 CHRONIC BILATERAL LOW BACK PAIN WITH BILATERAL SCIATICA: ICD-10-CM

## 2019-09-04 DIAGNOSIS — G89.29 CHRONIC BILATERAL LOW BACK PAIN WITH BILATERAL SCIATICA: ICD-10-CM

## 2019-09-04 DIAGNOSIS — E66.01 MORBID OBESITY WITH BMI OF 45.0-49.9, ADULT (HCC): ICD-10-CM

## 2019-09-04 PROCEDURE — 72110 X-RAY EXAM L-2 SPINE 4/>VWS: CPT | Performed by: FAMILY MEDICINE

## 2019-09-04 PROCEDURE — 99214 OFFICE O/P EST MOD 30 MIN: CPT | Performed by: FAMILY MEDICINE

## 2019-09-04 RX ORDER — TRAMADOL HYDROCHLORIDE 50 MG/1
50 TABLET ORAL EVERY 6 HOURS PRN
Qty: 30 TABLET | Refills: 0 | Status: SHIPPED | OUTPATIENT
Start: 2019-09-04 | End: 2019-09-29

## 2019-09-04 NOTE — PROGRESS NOTES
HPI:   Yamel Ojeda is a 68year old female who presents for recheck of her diabetes. Patient’s FBS have been 120-140. Last visit with ophthalmologist was last year. Pt has been checking her feet on a regular basis. Pt denies any tingling of the feet. Oral Cap TAKE ONE CAPSULE BY MOUTH ONCE EVERY DAY Disp: 90 capsule Rfl: 3   Cyclobenzaprine HCl 10 MG Oral Tab On po daily prn Disp: 20 tablet Rfl: 1   WIXELA INHUB 100-50 MCG/DOSE Inhalation Aerosol Powder, Breath Activated INHALE 1 PUFF BY MOUTH TWICE DA HFA) 108 (90 Base) MCG/ACT Inhalation Aero Soln Inhale 2 puffs into the lungs every 6 (six) hours as needed for Wheezing.  Disp: 3 Inhaler Rfl: 3      Past Medical History:   Diagnosis Date   • Essential hypertension, benign 1/15/2008   • Obesity, unspecifi nourished,in no apparent distress  SKIN: no rashes,no suspicious lesions  NECK: supple,no adenopathy,no bruits  LUNGS: clear to auscultation  CARDIO: RRR without murmur  GI: good BS's,no masses, HSM or tenderness  EXTREMITIES: no cyanosis, clubbing or jeremiah

## 2019-09-30 RX ORDER — TRAMADOL HYDROCHLORIDE 50 MG/1
TABLET ORAL
Qty: 30 TABLET | Refills: 1 | Status: SHIPPED | OUTPATIENT
Start: 2019-09-30 | End: 2021-12-09

## 2019-09-30 RX ORDER — FLUTICASONE PROPIONATE AND SALMETEROL 100; 50 UG/1; UG/1
POWDER RESPIRATORY (INHALATION)
Qty: 3 EACH | Refills: 3 | Status: SHIPPED | OUTPATIENT
Start: 2019-09-30 | End: 2020-03-18

## 2019-09-30 NOTE — TELEPHONE ENCOUNTER
Last refill on Tramadol #30 with 0 refills on 9 4 2019   Last refill on Gerardine Troncoso #14 with 3 refills on 4 8 2019  Last OV on 9 4 2019

## 2019-10-09 ENCOUNTER — TELEPHONE (OUTPATIENT)
Dept: FAMILY MEDICINE CLINIC | Facility: CLINIC | Age: 69
End: 2019-10-09

## 2019-10-22 RX ORDER — ATENOLOL AND CHLORTHALIDONE 100; 25 MG/1; MG/1
TABLET ORAL
Qty: 90 TABLET | Refills: 2 | Status: SHIPPED | OUTPATIENT
Start: 2019-10-22 | End: 2020-01-17

## 2019-11-20 ENCOUNTER — OFFICE VISIT (OUTPATIENT)
Dept: FAMILY MEDICINE CLINIC | Facility: CLINIC | Age: 69
End: 2019-11-20
Payer: MEDICARE

## 2019-11-20 VITALS
TEMPERATURE: 98 F | HEART RATE: 80 BPM | RESPIRATION RATE: 24 BRPM | BODY MASS INDEX: 45.99 KG/M2 | HEIGHT: 67 IN | DIASTOLIC BLOOD PRESSURE: 70 MMHG | SYSTOLIC BLOOD PRESSURE: 116 MMHG | WEIGHT: 293 LBS

## 2019-11-20 DIAGNOSIS — Z11.59 NEED FOR HEPATITIS C SCREENING TEST: ICD-10-CM

## 2019-11-20 DIAGNOSIS — Z12.31 VISIT FOR SCREENING MAMMOGRAM: ICD-10-CM

## 2019-11-20 DIAGNOSIS — E66.01 MORBID OBESITY WITH BMI OF 45.0-49.9, ADULT (HCC): ICD-10-CM

## 2019-11-20 DIAGNOSIS — E11.69 DIABETES MELLITUS TYPE 2 IN OBESE (HCC): ICD-10-CM

## 2019-11-20 DIAGNOSIS — Z23 FLU VACCINE NEED: ICD-10-CM

## 2019-11-20 DIAGNOSIS — G89.29 CHRONIC BILATERAL LOW BACK PAIN WITH LEFT-SIDED SCIATICA: ICD-10-CM

## 2019-11-20 DIAGNOSIS — I10 ESSENTIAL HYPERTENSION, BENIGN: ICD-10-CM

## 2019-11-20 DIAGNOSIS — M54.42 CHRONIC BILATERAL LOW BACK PAIN WITH LEFT-SIDED SCIATICA: ICD-10-CM

## 2019-11-20 DIAGNOSIS — Z00.00 MEDICARE ANNUAL WELLNESS VISIT, SUBSEQUENT: Primary | ICD-10-CM

## 2019-11-20 DIAGNOSIS — Z00.00 ENCOUNTER FOR MEDICARE ANNUAL WELLNESS EXAM: ICD-10-CM

## 2019-11-20 DIAGNOSIS — M81.0 AGE-RELATED OSTEOPOROSIS WITHOUT CURRENT PATHOLOGICAL FRACTURE: ICD-10-CM

## 2019-11-20 DIAGNOSIS — Z00.00 ENCOUNTER FOR ANNUAL HEALTH EXAMINATION: ICD-10-CM

## 2019-11-20 DIAGNOSIS — E66.9 DIABETES MELLITUS TYPE 2 IN OBESE (HCC): ICD-10-CM

## 2019-11-20 DIAGNOSIS — E03.9 HYPOTHYROIDISM, UNSPECIFIED TYPE: ICD-10-CM

## 2019-11-20 PROCEDURE — 90662 IIV NO PRSV INCREASED AG IM: CPT | Performed by: FAMILY MEDICINE

## 2019-11-20 PROCEDURE — G0008 ADMIN INFLUENZA VIRUS VAC: HCPCS | Performed by: FAMILY MEDICINE

## 2019-11-20 PROCEDURE — G0439 PPPS, SUBSEQ VISIT: HCPCS | Performed by: FAMILY MEDICINE

## 2019-11-20 NOTE — PATIENT INSTRUCTIONS
Kalyn Vázquez's SCREENING SCHEDULE   Tests on this list are recommended by your physician but may not be covered, or covered at this frequency, by your insurer. Please check with your insurance carrier before scheduling to verify coverage.    KAMARI patients who meet one of the following criteria:   • Men who are 73-68 years old and have smoked more than 100 cigarettes in their lifetime   • Anyone with a family history    Colorectal Cancer Screening  Covered up to Age 76     Colonoscopy Screen   Cover Immunizations      Influenza  Covered Annually Orders placed or performed in visit on 11/20/19   • FLU VACC HIGH DOSE PRSV FREE   Orders placed or performed in visit on 11/07/16   • FLU VACC PRSV FREE INC ANTIG   Orders placed or performed in visit on 11 of Advance Directives. It also has the State forms available on it's website for anyone to review and print using their home computer and printer. (the forms are also available in [address])  www. [employer] Media[employer]. org  This link also has information from the Am Welcome to Medicare, and non-screening if indicated for medical reasons Electrocardiogram date Routine EKG is not a screening covered service except at the Welcome to Medicare Visit    Abdominal aortic aneurysm screening (once between ages 73-68) IPPE only preventive care reminders to display for this patient. Chlamydia  Annually if high risk No results found for: CHLAMYDIA No flowsheet data found.     Screening Mammogram      Mammogram    Recommend Annually to at least age 76, and as needed after 76 Mamm Directives    [email or web address].no. org/publications/[email or web address]/personal_dec. pdf  An information packet, including necessary form from the [facility] website. http://[email or web address] id.[facility]. il.us/public/books/advin.htm  A link to the [place]

## 2019-11-20 NOTE — PROGRESS NOTES
HPI:   Richard Vasquez is a 68year old female who presents for a Medicare Subsequent Annual Wellness visit (Pt already had Initial Annual Wellness).     Qamarjustyn Monse is here for her [address], she has yet to get her MRI for her lower back pain and not seen Dr. Osborn Lob Kelly Butcher, DO as PCP - General (Family Practice)  Nuria Duncan DO as PCP - Yana Goetz MD as Consulting Physician (Jared Richardson)  Padmaja Albarran MD as Consulting Physician (NEUROLOGY)    Patient Active Problem List:     Essential hyperte BREAKFAST, 14 TO 17 UNITS AT LUNCH AND 12 TO 17 UNITS AT DINNER AND 23 UNITS FOR SNACKS  FLUTICASONE PROPIONATE 50 MCG/ACT Nasal Suspension, SHAKE LIQUID AND USE 1 SPRAY IN EACH NOSTRIL EVERY NIGHT  Albuterol Sulfate HFA (PROAIR HFA) 108 (90 Base) MCG/ACT otherwise  SKIN: denies any unusual skin lesions  EYES: denies blurred vision or double vision  HEENT: denies nasal congestion, sinus pain or ST  LUNGS: denies shortness of breath with exertion  CARDIOVASCULAR: denies chest pain on exertion  GI: denies abd adenopathy;  thyroid: not enlarged, symmetric, no tenderness/mass/nodules; no carotid bruit or JVD   Back:   Symmetric, no curvature, ROM limited, no CVA tenderness, has reproducible lumbar paraspin   Lungs:   Clear to auscultation bilaterally, respiration type   CONTINUE LEVOTHYROXINE  Essential hypertension, benign   CONINTUE RAMIPRIL 2.5 MG DAILY  Chronic bilateral low back pain with left-sided sciatica  -     MRI SPINE LUMBAR (CPT=72148);  Future   STILL HAS AN ORDER FOR PAIN CONSULT  Encounter for annual EKG - w/ Initial Preventative Physical Exam only, or if medically necessary Electrocardiogram date       Colorectal Cancer Screening      Colonoscopy Screen every 10 years Colonoscopy due on 04/20/2020 Update [facility] if applicable    Flex covered by Medicare Part B No vaccine history found This may be covered with your prescription benefits, but Medicare does not cover unless Medically needed    Zoster  Not covered by Medicare Part B No vaccine history found This may be covered with your ph

## 2019-12-10 RX ORDER — INSULIN GLARGINE 100 [IU]/ML
INJECTION, SOLUTION SUBCUTANEOUS
Qty: 18 PEN | Refills: 3 | Status: SHIPPED | OUTPATIENT
Start: 2019-12-10 | End: 2020-03-14

## 2019-12-10 NOTE — TELEPHONE ENCOUNTER
LOV: 11/20/19   Last Refill: 1/14/19 18 pen 3 Rf    No future appointments.     Last A1C: 6.9 11/14/18

## 2019-12-10 NOTE — TELEPHONE ENCOUNTER
BASAGLAR KWIKPEN 100 UNIT/ML Subcutaneous Solution Pen-injector   Pt needs 90 day supply script Would like it sent to [facility] in [place]

## 2019-12-27 NOTE — TELEPHONE ENCOUNTER
Spoke with patient. She does need a refill on the Metformin. If possible she would like the 500mg tablets instead of the 1000mg. The 1000mg are hard to cut.

## 2020-01-17 ENCOUNTER — TELEPHONE (OUTPATIENT)
Dept: FAMILY MEDICINE CLINIC | Facility: CLINIC | Age: 70
End: 2020-01-17

## 2020-01-17 RX ORDER — LEVOTHYROXINE SODIUM 0.05 MG/1
50 TABLET ORAL
Qty: 90 TABLET | Refills: 0 | Status: SHIPPED | OUTPATIENT
Start: 2020-01-17 | End: 2020-04-23

## 2020-01-17 RX ORDER — LEVOTHYROXINE SODIUM 0.05 MG/1
50 TABLET ORAL
Qty: 90 TABLET | Refills: 0 | Status: SHIPPED | OUTPATIENT
Start: 2020-01-17 | End: 2020-01-17

## 2020-01-17 RX ORDER — ATENOLOL AND CHLORTHALIDONE 100; 25 MG/1; MG/1
1 TABLET ORAL
Qty: 90 TABLET | Refills: 2 | Status: SHIPPED | OUTPATIENT
Start: 2020-01-17 | End: 2020-01-17

## 2020-01-17 RX ORDER — ATENOLOL AND CHLORTHALIDONE 100; 25 MG/1; MG/1
1 TABLET ORAL
Qty: 90 TABLET | Refills: 2 | Status: SHIPPED | OUTPATIENT
Start: 2020-01-17 | End: 2020-10-15

## 2020-01-17 NOTE — TELEPHONE ENCOUNTER
LEVOTHYROXINE SODIUM 50 MCG Oral Tab    ATENOLOL-CHLORTHALIDONE 100-25 MG Oral Tab    [id number] - [address], [phone], [phone]

## 2020-01-17 NOTE — TELEPHONE ENCOUNTER
Phone call from patient's . States that meds were sent incorrectly to [facility]. Need to be sent to [facility]. Meds sent to [facility]. Called [facility] - cancelled scripts.

## 2020-01-17 NOTE — TELEPHONE ENCOUNTER
Last refill on Levothyroxine #90 with 3 refills on 1 28 2019   Last refill on Atenolol #90 with 2 refills on 10 22 2019   Last TSH on 11 15 2018   No future appointments scheduled  Last OV on 11 20 2019   Levothyroxine does not meet protocol

## 2020-01-17 NOTE — TELEPHONE ENCOUNTER
SPOUSE CALLED AND ADV THAT HE REQUESTED REFILLS EARLIER TODAY AND NEVER GOT A CALL. SPOUSE ADV THAT MEDS NEED TO GO TO [facility].     PT WILL BE OUT OF MEDS OVER THE WEEKEND  (ATENOLOL)    PLEASE SEND TO [facility]     PLEASE CALL SPOUSE AND LET HIM [address]

## 2020-03-10 ENCOUNTER — TELEPHONE (OUTPATIENT)
Dept: FAMILY MEDICINE CLINIC | Facility: CLINIC | Age: 70
End: 2020-03-10

## 2020-03-10 RX ORDER — RAMIPRIL 2.5 MG/1
CAPSULE ORAL
Qty: 90 CAPSULE | Refills: 3 | Status: SHIPPED | OUTPATIENT
Start: 2020-03-10 | End: 2021-03-08

## 2020-03-10 NOTE — TELEPHONE ENCOUNTER
ramipril 2.5 MG Oral Cap  [facility] [address] [address] [address], [phone]

## 2020-03-14 RX ORDER — INSULIN GLARGINE 100 [IU]/ML
INJECTION, SOLUTION SUBCUTANEOUS
Qty: 18 PEN | Refills: 3 | Status: SHIPPED | OUTPATIENT
Start: 2020-03-14 | End: 2020-03-18

## 2020-03-14 NOTE — TELEPHONE ENCOUNTER
Pt needs a refill of the  BASAGLAR KWIKPEN 100 UNIT/ML Subcutaneous Solution Pen-injector  90 Days  [facility] [place]

## 2020-03-14 NOTE — TELEPHONE ENCOUNTER
No refill protocol for this medication. Last refill: 12/10/2019 #18 pen with 3 refills  Last Visit: 11/20/2019  Next Visit: No future appointments. Forward to Dr. Stanley Gagnon please advise on refills. Thanks.

## 2020-03-18 RX ORDER — FLUTICASONE PROPIONATE AND SALMETEROL 100; 50 UG/1; UG/1
POWDER RESPIRATORY (INHALATION)
Qty: 3 EACH | Refills: 3 | Status: SHIPPED | OUTPATIENT
Start: 2020-03-18 | End: 2020-07-14

## 2020-03-18 RX ORDER — INSULIN GLARGINE 100 [IU]/ML
INJECTION, SOLUTION SUBCUTANEOUS
Qty: 30 PEN | Refills: 3 | Status: SHIPPED | OUTPATIENT
Start: 2020-03-18 | End: 2020-07-06

## 2020-03-18 NOTE — TELEPHONE ENCOUNTER
Pt would like script for Tessalon- just like     Pt needs 90 day supply for Wixela and Basaglar pens    Also has cough and would like what  got    Please send Tessalon

## 2020-03-19 ENCOUNTER — TELEPHONE (OUTPATIENT)
Dept: FAMILY MEDICINE CLINIC | Facility: CLINIC | Age: 70
End: 2020-03-19

## 2020-03-19 RX ORDER — BENZONATATE 200 MG/1
200 CAPSULE ORAL 3 TIMES DAILY PRN
Qty: 21 CAPSULE | Refills: 0 | Status: SHIPPED | OUTPATIENT
Start: 2020-03-19 | End: 2020-03-28

## 2020-03-19 NOTE — TELEPHONE ENCOUNTER
Routing to provider- pt needs order of Tessalon.     Please send script- pt  is also on Tessalon- [facility] in [place] back to [address] [facility]-

## 2020-03-19 NOTE — TELEPHONE ENCOUNTER
SPOUSE CALLED AND ADV THAT HE NEVER GOT A CALL BACK FROM YESTERDAY ON MED REQUEST. ADV PT THAT 2 OF THE REQUESTS WERE SENT IN. THE COUGH PILLS (TESSALON) BENZONATATE 200 MG WAS NOT SENT IN.     PLEASE CALL AND ADV      [facility]

## 2020-03-28 DIAGNOSIS — N95.0 POSTMENOPAUSAL BLEEDING: ICD-10-CM

## 2020-03-28 RX ORDER — BENZONATATE 200 MG/1
200 CAPSULE ORAL 3 TIMES DAILY PRN
Qty: 21 CAPSULE | Refills: 0 | Status: SHIPPED | OUTPATIENT
Start: 2020-03-28 | End: 2020-04-07

## 2020-03-28 RX ORDER — ESCITALOPRAM OXALATE 10 MG/1
TABLET ORAL
Qty: 90 TABLET | Refills: 3 | Status: SHIPPED | OUTPATIENT
Start: 2020-03-28 | End: 2021-03-24

## 2020-03-28 RX ORDER — MEDROXYPROGESTERONE ACETATE 10 MG/1
TABLET ORAL
Qty: 90 TABLET | Refills: 0 | Status: SHIPPED | OUTPATIENT
Start: 2020-03-28 | End: 2021-04-09

## 2020-03-28 NOTE — TELEPHONE ENCOUNTER
Last OV 11/20/19  Escitalopram last refilled 4/5/19  #90  3 refills      Benzonatate last refilled 19/2020  #21 0 refills

## 2020-03-28 NOTE — TELEPHONE ENCOUNTER
No refill protocol for this medication. Last refill: 10/09/2018 #90 with 0 refills  Last Visit: 11/20/2019  Next Visit: No future appointments. Forward to Dr. Sharyle Bjork please advise on refills. Thanks.

## 2020-03-28 NOTE — TELEPHONE ENCOUNTER
Pt's  called Pt still has a cough and is wanting to get a refill of the cough medicine(BENZONATATE )   He states cough is getting better not completely gone but the medication is working.      Also needs refills of    ESCITALOPRAM 10 MG Oral Tab 90 d

## 2020-03-28 NOTE — TELEPHONE ENCOUNTER
MedroxyPROGESTERone Acetate 10 MG Oral Tab      Pt would like sent to [address] [facility] [place].

## 2020-04-07 RX ORDER — BENZONATATE 200 MG/1
CAPSULE ORAL
Qty: 21 CAPSULE | Refills: 0 | Status: SHIPPED | OUTPATIENT
Start: 2020-04-07 | End: 2021-12-09 | Stop reason: ALTCHOICE

## 2020-04-07 NOTE — TELEPHONE ENCOUNTER
Refill request per protocol: none  Last refilled 3/28/20 #21  LOV 11/20/19  No future appt  Routed to PCP to advise

## 2020-04-07 NOTE — TELEPHONE ENCOUNTER
Spoke to , Yamilet Mendiola, it was a request she had put in. He stated she is still coughing, not as bad as before. Her sleep schedule is off. Stated she is sleeping more during the day rather than at night. Was sleeping when I called.    No fever or other symp

## 2020-04-23 ENCOUNTER — TELEPHONE (OUTPATIENT)
Dept: FAMILY MEDICINE CLINIC | Facility: CLINIC | Age: 70
End: 2020-04-23

## 2020-04-23 RX ORDER — LEVOTHYROXINE SODIUM 0.05 MG/1
TABLET ORAL
Qty: 90 TABLET | Refills: 0 | Status: SHIPPED | OUTPATIENT
Start: 2020-04-23 | End: 2020-07-21

## 2020-04-23 NOTE — TELEPHONE ENCOUNTER
Asking for refill BD PEN NEEDLE SHORT U/F 31G X 8 MM    Levothyroxine Sodium 50 MCG Oral Tab     Need 90 day  [facility]

## 2020-04-23 NOTE — TELEPHONE ENCOUNTER
LOV: 11/20/19  Last Refill: 1/17/20 #90 0 RF    No future appointments.     Lab Results   Component Value Date    T4F 1.3 11/14/2018    TSH 1.940 11/14/2018

## 2020-05-29 ENCOUNTER — TELEPHONE (OUTPATIENT)
Dept: FAMILY MEDICINE CLINIC | Facility: CLINIC | Age: 70
End: 2020-05-29

## 2020-06-17 ENCOUNTER — TELEPHONE (OUTPATIENT)
Dept: FAMILY MEDICINE CLINIC | Facility: CLINIC | Age: 70
End: 2020-06-17

## 2020-06-17 NOTE — TELEPHONE ENCOUNTER
metFORMIN HCl 500 MG Oral Tab    90 day supply     Pt would like sent [address], [address], [phone]

## 2020-07-06 RX ORDER — INSULIN GLARGINE 100 [IU]/ML
INJECTION, SOLUTION SUBCUTANEOUS
Qty: 30 PEN | Refills: 3 | Status: SHIPPED | OUTPATIENT
Start: 2020-07-06 | End: 2020-12-10

## 2020-07-06 NOTE — TELEPHONE ENCOUNTER
LOV: 11/20/19   Last Refill: 03/18/20 #30 3RF    Future Appointments   Date Time Provider Lei Lianet   11/23/2020  8:20 AM Laura Leal DO [facility] Hiram Niece     Last A1c value was 6.9% done 11/14/2018.

## 2020-07-14 NOTE — TELEPHONE ENCOUNTER
Asthma & COPD Medication Protocol Failed7/14 1:37 PM   Asthma Action Score greater than or equal to 20    Appointment in past 6 or next 3 months     AAP/ACT given in last 12 months     Last OV on 11 20 2019  Appointment on 11 23 2020

## 2020-07-21 ENCOUNTER — TELEPHONE (OUTPATIENT)
Dept: FAMILY MEDICINE CLINIC | Facility: CLINIC | Age: 70
End: 2020-07-21

## 2020-07-21 RX ORDER — LEVOTHYROXINE SODIUM 0.05 MG/1
50 TABLET ORAL DAILY
Qty: 90 TABLET | Refills: 0 | Status: CANCELLED | OUTPATIENT
Start: 2020-07-21

## 2020-07-21 RX ORDER — LEVOTHYROXINE SODIUM 0.05 MG/1
TABLET ORAL
Qty: 90 TABLET | Refills: 2 | Status: SHIPPED | OUTPATIENT
Start: 2020-07-21 | End: 2021-04-09

## 2020-07-21 NOTE — TELEPHONE ENCOUNTER
Thyroid Supplements Protocol Failed7/21 11:08 AM   TSH test in past 12 months    TSH value between 0.350 and 5.500 IU/ml    Appointment in past 12 or next 3 months

## 2020-07-21 NOTE — TELEPHONE ENCOUNTER
PT NEEDS 90 DAYS SUPPLY PLEASE.     PT ALSO ADV THAT SHE NEEDS 90 DAY SUPPLY OF     SOLUSTAR     AND     MedroxyPROGESTERone Acetate 10 MG Oral Tab      PLEASE MAKE [address] Detail Level: Detailed

## 2020-07-31 ENCOUNTER — TELEPHONE (OUTPATIENT)
Dept: FAMILY MEDICINE CLINIC | Facility: CLINIC | Age: 70
End: 2020-07-31

## 2020-08-03 ENCOUNTER — TELEPHONE (OUTPATIENT)
Dept: FAMILY MEDICINE CLINIC | Facility: CLINIC | Age: 70
End: 2020-08-03

## 2020-08-03 RX ORDER — BLOOD SUGAR DIAGNOSTIC
STRIP MISCELLANEOUS
Qty: 200 STRIP | Refills: 0 | Status: SHIPPED | OUTPATIENT
Start: 2020-08-03 | End: 2021-08-03

## 2020-08-03 RX ORDER — LANCETS 33 GAUGE
EACH MISCELLANEOUS
Qty: 200 EACH | Refills: 0 | Status: SHIPPED | OUTPATIENT
Start: 2020-08-03

## 2020-08-03 NOTE — TELEPHONE ENCOUNTER
Srinivasa Badillo had a cortisone injection in knee. Was advised to check his BG. Went out to buy a glucometer. Then handed Heaven Hernandez the meter and had her check her BG as she hasn't checked it in a while.  at that point.   Looking to get a CGM so she does not have

## 2020-08-03 NOTE — TELEPHONE ENCOUNTER
Given her history I am SURE her readings are elevated and the longer she waits the longer it will take to get, so lets not wait too long

## 2020-08-03 NOTE — TELEPHONE ENCOUNTER
Spouse called, states pt will not qualify for diabetic stuff under Medicare unless she self tests for a period of time.   They are waiting for a call back from the diabetic specialist who wants pt to do something but yet Medicare said pt won't qualify so sp

## 2020-08-05 ENCOUNTER — TELEPHONE (OUTPATIENT)
Dept: FAMILY MEDICINE CLINIC | Facility: CLINIC | Age: 70
End: 2020-08-05

## 2020-08-05 RX ORDER — BLOOD-GLUCOSE METER
EACH MISCELLANEOUS
Qty: 1 KIT | Refills: 0 | Status: SHIPPED | OUTPATIENT
Start: 2020-08-05

## 2020-08-05 NOTE — TELEPHONE ENCOUNTER
Pt's spouse called back to let us know that his device is a OneTouch Ultra. His device is from 1997.    Please return call to [phone]

## 2020-08-05 NOTE — TELEPHONE ENCOUNTER
Donnella Mortimer states the wrong strips were ordered. Advised we ordered the strips with the name that was given. Will go home and double check the glucometer and call back with brand name.

## 2020-08-05 NOTE — TELEPHONE ENCOUNTER
Nneka Ayala is telling him they did not receive this. Clarification request was faxed to them yesterday. Called and spoke with Irma. States they did not receive fax. Confirmed fax # of [phone]. Will fax again.

## 2020-08-05 NOTE — TELEPHONE ENCOUNTER
Spouse called, said he spoke with pharmacy and they claim to have sent us a fax asking for more information.  This would be for pt's diabetic meds and strips

## 2020-08-14 RX ORDER — POTASSIUM CHLORIDE 750 MG/1
TABLET, FILM COATED, EXTENDED RELEASE ORAL
Qty: 38 TABLET | Refills: 5 | Status: SHIPPED | OUTPATIENT
Start: 2020-08-14 | End: 2021-12-09

## 2020-08-14 NOTE — TELEPHONE ENCOUNTER
No refill protocol for this medication. Last refill: 2/25/2019 #90 with 2 refills  Last Visit: 11/20/2019  Next Visit:   Future Appointments   Date Time Provider Lei Martini   11/23/2020  8:20 AM Arabella Leal, [facility] BELINDA Montano         Forward to Dr. Vibha Ochoa please advise on refills. Thanks.

## 2020-10-15 RX ORDER — ATENOLOL AND CHLORTHALIDONE TABLET 100; 25 MG/1; MG/1
1 TABLET ORAL
Qty: 90 TABLET | Refills: 2 | Status: SHIPPED | OUTPATIENT
Start: 2020-10-15 | End: 2021-07-12

## 2020-10-15 NOTE — TELEPHONE ENCOUNTER
Last refilled 1/17/20 #90 with 2 RF  LOV with DS 11/20/19  Future appt with DS 11/23/20  Medication failed protocol due to the following reasons:  Last CMP 11/14/18  Future blood orders placed, fyi  Routed to PCP to advise     Hypertension Medications Protocol Rbnmwu13/15/2020 10:18 AM   CMP or BMP in past 12 months

## 2020-12-10 RX ORDER — INSULIN GLARGINE 100 [IU]/ML
INJECTION, SOLUTION SUBCUTANEOUS
Qty: 45 ML | Refills: 0 | Status: SHIPPED | OUTPATIENT
Start: 2020-12-10 | End: 2020-12-10

## 2020-12-10 RX ORDER — INSULIN GLARGINE 100 [IU]/ML
INJECTION, SOLUTION SUBCUTANEOUS
Qty: 45 ML | Refills: 2 | Status: SHIPPED | OUTPATIENT
Start: 2020-12-10 | End: 2021-12-01

## 2020-12-21 RX ORDER — INSULIN ASPART 100 [IU]/ML
INJECTION, SOLUTION INTRAVENOUS; SUBCUTANEOUS
Qty: 75 ML | Refills: 6 | Status: SHIPPED | OUTPATIENT
Start: 2020-12-21 | End: 2020-12-29

## 2020-12-21 NOTE — TELEPHONE ENCOUNTER
Insulin Aspart Pen (NOVOLOG FLEXPEN) 100 UNIT/ML Subcutaneous Solution Pen-injection     This is before snacks and meals --Base number is 14      Pt would like a 90 supply and would like script sent to   [address], [address] [place] [address] [address]

## 2020-12-21 NOTE — TELEPHONE ENCOUNTER
LOV: 11/20/19   Last Refill:3/18/19 75ml 6 RF    No future appointments. Last A1c value was 6.9% done 11/14/2018.

## 2020-12-29 ENCOUNTER — TELEPHONE (OUTPATIENT)
Dept: FAMILY MEDICINE CLINIC | Facility: CLINIC | Age: 70
End: 2020-12-29

## 2020-12-29 RX ORDER — INSULIN ASPART 100 [IU]/ML
INJECTION, SOLUTION INTRAVENOUS; SUBCUTANEOUS
Qty: 75 ML | Refills: 6 | Status: SHIPPED | OUTPATIENT
Start: 2020-12-29

## 2020-12-29 NOTE — TELEPHONE ENCOUNTER
Pt was advised- verbalized understanding    Revised script sent to pharmacy witn information on max daily dosage and DX in notes

## 2020-12-29 NOTE — TELEPHONE ENCOUNTER
Pt  called to say [facility] sent over a revised script for a generic. He said please ignore the request as they only want the name brand.      Prosper Gallagher

## 2020-12-29 NOTE — TELEPHONE ENCOUNTER
Pt wants to clarify dosage information on Novolog    Pt states she is doing between 16-18 units per meal   And 12 units per snack.   Base dose is 14 and she increases by 1 unit for every 25 she is over 100 on her BS readings    Pt typically has 3 meals aundrea

## 2020-12-29 NOTE — TELEPHONE ENCOUNTER
Pt called to discuss the dosage of insulin. She states that the amount that was sent to the pharmacy was not enough. Pt states she is not sure what amount she needs but knows the prescribed amount is not enough.      Please advise

## 2021-01-07 ENCOUNTER — TELEPHONE (OUTPATIENT)
Dept: FAMILY MEDICINE CLINIC | Facility: CLINIC | Age: 71
End: 2021-01-07

## 2021-01-14 RX ORDER — PEN NEEDLE, DIABETIC 31 GX5/16"
NEEDLE, DISPOSABLE MISCELLANEOUS
Qty: 600 EACH | Refills: 0 | Status: SHIPPED | OUTPATIENT
Start: 2021-01-14 | End: 2021-11-02

## 2021-01-14 NOTE — TELEPHONE ENCOUNTER
Diabetic Supplies Protocol Avrmub1401/14/2021 10:33 AM   Appointment in the past 12 or next 3 months     LOV: 11/20/19   Last Refill: 4/23/20 #600 0 Rf    No future appointments.

## 2021-03-08 DIAGNOSIS — Z23 NEED FOR VACCINATION: ICD-10-CM

## 2021-03-08 RX ORDER — RAMIPRIL 2.5 MG/1
CAPSULE ORAL
Qty: 90 CAPSULE | Refills: 2 | Status: SHIPPED | OUTPATIENT
Start: 2021-03-08 | End: 2022-06-04

## 2021-03-08 NOTE — TELEPHONE ENCOUNTER
Hypertension Medications Protocol Dfvmxm6703/08/2021 08:31 AM   CMP or BMP in past 12 months Protocol Details    Appointment in past 6 or next 3 months     Last serum creatinine< 2.0      Routing to provider per protocol. Last refilled on 3/10/20 for # 90 with 3 rf. Last labs 11/14/18. Last seen on 11/20/19. No future appointments. Thank you.

## 2021-03-19 ENCOUNTER — TELEPHONE (OUTPATIENT)
Dept: FAMILY MEDICINE CLINIC | Facility: CLINIC | Age: 71
End: 2021-03-19

## 2021-03-19 NOTE — TELEPHONE ENCOUNTER
Just calling to let us know she got her first [place] vaccine through her county.  She is getting her second on 4/8

## 2021-03-24 RX ORDER — ESCITALOPRAM OXALATE 10 MG/1
TABLET ORAL
Qty: 90 TABLET | Refills: 3 | Status: SHIPPED | OUTPATIENT
Start: 2021-03-24

## 2021-03-24 RX ORDER — ESCITALOPRAM OXALATE 10 MG/1
TABLET ORAL
Qty: 90 TABLET | Refills: 0 | OUTPATIENT
Start: 2021-03-24

## 2021-03-24 NOTE — TELEPHONE ENCOUNTER
Pt called, needs refill on escitalopram 10 MG Oral Tab-90 day supply. Pharmacy[facility]. Please call pt at [phone]. Pt also got her first covid vaccine shot.

## 2021-04-09 ENCOUNTER — TELEPHONE (OUTPATIENT)
Dept: FAMILY MEDICINE CLINIC | Facility: CLINIC | Age: 71
End: 2021-04-09

## 2021-04-09 DIAGNOSIS — N95.0 POSTMENOPAUSAL BLEEDING: ICD-10-CM

## 2021-04-09 RX ORDER — MEDROXYPROGESTERONE ACETATE 10 MG/1
TABLET ORAL
Qty: 30 TABLET | Refills: 0 | Status: SHIPPED | OUTPATIENT
Start: 2021-04-09 | End: 2021-09-09

## 2021-04-09 RX ORDER — LEVOTHYROXINE SODIUM 0.05 MG/1
TABLET ORAL
Qty: 90 TABLET | Refills: 0 | Status: SHIPPED | OUTPATIENT
Start: 2021-04-09 | End: 2021-07-09

## 2021-04-09 NOTE — TELEPHONE ENCOUNTER
Spouse called, states pt has received both her covid shots and would like for the reminder call/message to be discontinued.

## 2021-04-09 NOTE — TELEPHONE ENCOUNTER
Spouse called, pt needs refills on LEVOTHYROXINE SODIUM 50 MCG Oral Tab and medroxyPROGESTERone Acetate 10 MG Oral Tab, 90 day supplies for each. Pharmacy[facility] [place].   Please call spouse at [phone]

## 2021-04-09 NOTE — TELEPHONE ENCOUNTER
Thyroid Supplements Protocol Reytlv4404/09/2021 10:05 AM   TSH test in past 12 months Protocol Details    TSH value between 0.350 and 5.500 IU/ml     Appointment in past 12 or next 3 months      Last OV on 11 20 2019   Last TSH on 11 14 2018

## 2021-06-04 NOTE — TELEPHONE ENCOUNTER
Diabetic Medication Protocol Tratyt4606/04/2021 02:47 PM   HgBA1C procedure resulted in past 6 months Protocol Details    Last HgBA1C < 7.5     Appointment in past 6 or next 3 months     Microalbumin procedure in past 12 months or taking ACE/ARB      Last re

## 2021-07-09 RX ORDER — LEVOTHYROXINE SODIUM 0.05 MG/1
50 TABLET ORAL DAILY
Qty: 90 TABLET | Refills: 2 | Status: SHIPPED | OUTPATIENT
Start: 2021-07-09 | End: 2021-10-07

## 2021-07-09 NOTE — TELEPHONE ENCOUNTER
Pt failed refill protocol for the following reasons:    Thyroid Supplements Protocol Plplqx5507/09/2021 03:02 PM   TSH test in past 12 months Protocol Details    TSH value between 0.350 and 5.500 IU/ml     Appointment in past 12 or next 3 months          [place]

## 2021-07-09 NOTE — TELEPHONE ENCOUNTER
Patient called requesting refill for:    LEVOTHYROXINE SODIUM 50 MCG Oral Tab 90 tablet     [facility] [address] [facility], [address] [place] [address] [address], [phone]    Please advise # [phone]

## 2021-07-12 RX ORDER — ATENOLOL AND CHLORTHALIDONE 100; 25 MG/1; MG/1
1 TABLET ORAL
Qty: 90 TABLET | Refills: 2 | Status: SHIPPED | OUTPATIENT
Start: 2021-07-12

## 2021-07-12 RX ORDER — ATENOLOL AND CHLORTHALIDONE 100; 25 MG/1; MG/1
1 TABLET ORAL
Qty: 90 TABLET | Refills: 0 | OUTPATIENT
Start: 2021-07-12

## 2021-07-12 NOTE — TELEPHONE ENCOUNTER
Pt called to request a refill of the following medication. Pt is requesting a 90 day supply.     Atenolol-Chlorthalidone 100-25 MG Oral Tab    [address], [phone]   [address] [place] [address]

## 2021-07-19 RX ORDER — FLUTICASONE PROPIONATE AND SALMETEROL 50; 100 UG/1; UG/1
POWDER RESPIRATORY (INHALATION)
Qty: 180 EACH | Refills: 3 | Status: SHIPPED | OUTPATIENT
Start: 2021-07-19

## 2021-09-09 DIAGNOSIS — N95.0 POSTMENOPAUSAL BLEEDING: ICD-10-CM

## 2021-09-09 RX ORDER — MEDROXYPROGESTERONE ACETATE 10 MG/1
TABLET ORAL
Qty: 30 TABLET | Refills: 2 | Status: SHIPPED | OUTPATIENT
Start: 2021-09-09 | End: 2021-12-09

## 2021-09-09 NOTE — TELEPHONE ENCOUNTER
Gynecology Medication Protocol Mojnxz0609/09/2021 02:26 PM   PASS--PENDING LAST PAP WNL--VIA MANUAL LOOKUP    Mammogram in past 12 months    Physical or Pelvic/Breast in past 12 or next 3 mos--VIA MANUAL LOOKUP     Last OV on 11 20 2019   Upcoming appointment on 11 24 2021.   Last refill on Medroxyprogesterone #30 with 0 refills on 4 9 2021

## 2021-11-02 DIAGNOSIS — E11.69 DIABETES MELLITUS TYPE 2 IN OBESE (HCC): Primary | ICD-10-CM

## 2021-11-02 DIAGNOSIS — E66.9 DIABETES MELLITUS TYPE 2 IN OBESE (HCC): Primary | ICD-10-CM

## 2021-11-02 RX ORDER — PEN NEEDLE, DIABETIC 31 GX5/16"
NEEDLE, DISPOSABLE MISCELLANEOUS
Qty: 600 EACH | Refills: 0 | Status: SHIPPED | OUTPATIENT
Start: 2021-11-02

## 2021-11-02 NOTE — TELEPHONE ENCOUNTER
Diabetic Supplies Protocol Passed 11/02/2021 11:53 AM    Appointment in the past 12 or next 3 months     Refilled per refill protocol.

## 2021-11-30 NOTE — TELEPHONE ENCOUNTER
Diabetic Medication Protocol Failed 11/30/2021 09:41 AM   Protocol Details  HgBA1C procedure resulted in past 6 months    Last HgBA1C < 7.5    Microalbumin procedure in past 12 months or taking ACE/ARB    Appointment in past 6 or next 3 months     LOV: 11/20/19   Last Refill: 6/4/21 #180 1 RF    Future Appointments   Date Time Provider Lei Martini   12/9/2021  9:00 AM Steve Gabriel [facility] BELINDA Ayoub

## 2021-12-01 RX ORDER — INSULIN GLARGINE 100 [IU]/ML
INJECTION, SOLUTION SUBCUTANEOUS
Qty: 45 ML | Refills: 3 | Status: SHIPPED | OUTPATIENT
Start: 2021-12-01

## 2021-12-02 RX ORDER — INSULIN GLARGINE 100 [IU]/ML
INJECTION, SOLUTION SUBCUTANEOUS
Qty: 45 ML | Refills: 3 | OUTPATIENT
Start: 2021-12-02

## 2021-12-09 ENCOUNTER — OFFICE VISIT (OUTPATIENT)
Dept: FAMILY MEDICINE CLINIC | Facility: CLINIC | Age: 71
End: 2021-12-09
Payer: MEDICARE

## 2021-12-09 VITALS
HEART RATE: 76 BPM | SYSTOLIC BLOOD PRESSURE: 108 MMHG | WEIGHT: 262 LBS | DIASTOLIC BLOOD PRESSURE: 70 MMHG | RESPIRATION RATE: 20 BRPM | BODY MASS INDEX: 42.11 KG/M2 | HEIGHT: 66 IN | TEMPERATURE: 97 F

## 2021-12-09 DIAGNOSIS — E11.69 DIABETES MELLITUS TYPE 2 IN OBESE (HCC): ICD-10-CM

## 2021-12-09 DIAGNOSIS — Z00.00 ENCOUNTER FOR ANNUAL HEALTH EXAMINATION: ICD-10-CM

## 2021-12-09 DIAGNOSIS — Z12.31 VISIT FOR SCREENING MAMMOGRAM: ICD-10-CM

## 2021-12-09 DIAGNOSIS — Z00.00 ENCOUNTER FOR MEDICARE ANNUAL WELLNESS EXAM: ICD-10-CM

## 2021-12-09 DIAGNOSIS — E03.9 ACQUIRED HYPOTHYROIDISM: ICD-10-CM

## 2021-12-09 DIAGNOSIS — Z13.1 SCREENING FOR DIABETES MELLITUS (DM): ICD-10-CM

## 2021-12-09 DIAGNOSIS — M81.0 AGE-RELATED OSTEOPOROSIS WITHOUT CURRENT PATHOLOGICAL FRACTURE: ICD-10-CM

## 2021-12-09 DIAGNOSIS — Z00.00 MEDICARE ANNUAL WELLNESS VISIT, SUBSEQUENT: Primary | ICD-10-CM

## 2021-12-09 DIAGNOSIS — E66.9 DIABETES MELLITUS TYPE 2 IN OBESE (HCC): ICD-10-CM

## 2021-12-09 DIAGNOSIS — Z13.6 SCREENING FOR CARDIOVASCULAR CONDITION: ICD-10-CM

## 2021-12-09 DIAGNOSIS — M51.36 LUMBAR DEGENERATIVE DISC DISEASE: ICD-10-CM

## 2021-12-09 DIAGNOSIS — Z11.59 NEED FOR HEPATITIS C SCREENING TEST: ICD-10-CM

## 2021-12-09 DIAGNOSIS — M48.062 SPINAL STENOSIS, LUMBAR REGION WITH NEUROGENIC CLAUDICATION: ICD-10-CM

## 2021-12-09 DIAGNOSIS — Z12.31 ENCOUNTER FOR SCREENING MAMMOGRAM FOR BREAST CANCER: ICD-10-CM

## 2021-12-09 DIAGNOSIS — I10 ESSENTIAL HYPERTENSION, BENIGN: ICD-10-CM

## 2021-12-09 DIAGNOSIS — E66.01 MORBID OBESITY WITH BMI OF 45.0-49.9, ADULT (HCC): ICD-10-CM

## 2021-12-09 DIAGNOSIS — Z23 FLU VACCINE NEED: ICD-10-CM

## 2021-12-09 PROBLEM — F33.1 MAJOR DEPRESSIVE DISORDER, RECURRENT, MODERATE (HCC): Status: ACTIVE | Noted: 2021-12-09

## 2021-12-09 PROCEDURE — 83036 HEMOGLOBIN GLYCOSYLATED A1C: CPT | Performed by: FAMILY MEDICINE

## 2021-12-09 PROCEDURE — 80061 LIPID PANEL: CPT | Performed by: FAMILY MEDICINE

## 2021-12-09 PROCEDURE — G0008 ADMIN INFLUENZA VIRUS VAC: HCPCS | Performed by: FAMILY MEDICINE

## 2021-12-09 PROCEDURE — G0439 PPPS, SUBSEQ VISIT: HCPCS | Performed by: FAMILY MEDICINE

## 2021-12-09 PROCEDURE — 86803 HEPATITIS C AB TEST: CPT | Performed by: FAMILY MEDICINE

## 2021-12-09 PROCEDURE — 80053 COMPREHEN METABOLIC PANEL: CPT | Performed by: FAMILY MEDICINE

## 2021-12-09 PROCEDURE — 82043 UR ALBUMIN QUANTITATIVE: CPT | Performed by: FAMILY MEDICINE

## 2021-12-09 PROCEDURE — 82570 ASSAY OF URINE CREATININE: CPT | Performed by: FAMILY MEDICINE

## 2021-12-09 PROCEDURE — 84443 ASSAY THYROID STIM HORMONE: CPT | Performed by: FAMILY MEDICINE

## 2021-12-09 PROCEDURE — 85025 COMPLETE CBC W/AUTO DIFF WBC: CPT | Performed by: FAMILY MEDICINE

## 2021-12-09 PROCEDURE — 90662 IIV NO PRSV INCREASED AG IM: CPT | Performed by: FAMILY MEDICINE

## 2021-12-09 RX ORDER — DIPHENHYDRAMINE HCL 25 MG
25 TABLET ORAL
COMMUNITY

## 2021-12-09 NOTE — PROGRESS NOTES
HPI:   Manuel Langford is a 66year old female who presents for a Medicare Subsequent Annual Wellness visit (Pt already had Initial Annual Wellness).     Jayden Hilario is here for her [address], no surgery since last here, no new RX meds, and no OTC, she and her husba some help   She has Vision problems based on screening of functional status. Vision Problems? : Yes (wear glasses)   She has Walking problems based on screening of functional status.    Difficulty walking?: Yes (back issues)   She has problems with Daily as PCP - General (Family Practice)  Evelyne Dorado MD as Consulting Physician (Oanh Barton)  Benjie Hanna MD as Consulting Physician (NEUROLOGY)    Patient Active Problem List:     Essential hypertension, benign     Osteoporosis     Hypothyro Aspart Pen (NOVOLOG FLEXPEN) 100 UNIT/ML Subcutaneous Solution Pen-injector, INJECT 14 TO 17 UNITS AT BREAKFAST, 14 TO 17 UNITS AT LUNCH AND 12 TO 17 UNITS AT DINNER AND 23 UNITS FOR SNACKS  FLUTICASONE PROPIONATE 50 MCG/ACT Nasal Suspension, SHAKE LIQUID incontinence   MUSCULOSKELETAL: HAS constant low  back pain with radiation into her pelvis and hamstrings posteriorly  NEURO: denies headaches  PSYCHE: denies depression or anxiety  HEMATOLOGIC: denies hx of anemia  ENDOCRINE: denies thyroid history  ALL/A non-tender, bowel sounds active all four quadrants,  no masses, no organomegaly   Pelvic: Deferred   Extremities: Extremities normal, atraumatic, no cyanosis or edema   Pulses: 2+ and symmetric   Skin: Skin color, texture, turgor normal, no rashes or lesio glucometer with her, I hope her weight loss is due to her efforts versus her DM being out of control. Diabetes mellitus type 2 in obese (HCC)  -     HEMOGLOBIN A1C; Future  -     MICROALB/CREAT RATIO, RANDOM URINE;  Future   Difficult to say if she has any FOLLOW UP MRI AND LIKELY SURGICAL INTERVENTION    FATIGUE- WITH HER HX OF HTN, DIABETES, AND OBESITY, WILL NEEDS CARDIAC WORK UP TO RULE CAD, ESPECIALLY IF CONSIDERING SURGERY    Will refill her meds for 3 months until they get established in [address] Cardiovascular Disease Screening    Lipid Panel  Cholesterol  Lipoprotein (HDL)  Triglycerides Covered every 5 years for all Medicare beneficiaries without apparent signs or symptoms of cardiovascular disease Lab Results   Component Value Date    [facility] 11/07/2016    Esaqmzoqz12: 03/01/2011     Pneumococcal Vaccination(2 of 2 - PPSV23) due on 11/07/2021    Hepatitis B One screening covered for patients with certain risk factors   -  No recommendations at this time    Tetanus Toxoid Not covered by [facility]

## 2021-12-09 NOTE — PATIENT INSTRUCTIONS
Henrik Vázquez's SCREENING SCHEDULE   Tests on this list are recommended by your physician but may not be covered, or covered at this frequency, by your insurer. Please check with your insurance carrier before scheduling to verify coverage.    PREVENT Pap and Pelvic    Pap   Covered every 2 years for women at normal risk;  Annually if at high risk -  No recommendations at this time    Chlamydia Annually if high risk -  No recommendations at this time   Screening Mammogram    Mammogram     Recommend katerina Creatinine   Annually Lab Results   Component Value Date    CREATSERUM 1.22 (H) 11/14/2018         BUN Annually Lab Results   Component Value Date    BUN 16 11/14/2018       Drug Serum Conc Annually No results found for: DIGOXIN, DIG, VALP              Rec

## 2021-12-10 ENCOUNTER — TELEPHONE (OUTPATIENT)
Dept: FAMILY MEDICINE CLINIC | Facility: CLINIC | Age: 71
End: 2021-12-10

## 2021-12-10 NOTE — TELEPHONE ENCOUNTER
----- Message from Maria M Hewitt DO sent at 12/10/2021  6:09 AM CST -----  Can notify Graciemartha Dorado her BS control is not so great, even with her weight loss she is still at 9.9. Can we. Emi Manus how much of her meds she is taking for her DM?  Also she needs to drink

## 2021-12-13 ENCOUNTER — TELEPHONE (OUTPATIENT)
Dept: FAMILY MEDICINE CLINIC | Facility: CLINIC | Age: 71
End: 2021-12-13

## 2021-12-13 NOTE — TELEPHONE ENCOUNTER
----- Message from Lamar Leventhal, DO sent at 12/13/2021  8:20 AM CST -----  Please notify Cayla Petres that her cholesterol is really high, and probably due to her diabetes as well.  She really should be on a cholesterol medicine like her , to help reduce th

## 2021-12-14 RX ORDER — ATORVASTATIN CALCIUM 20 MG/1
20 TABLET, FILM COATED ORAL NIGHTLY
Qty: 90 TABLET | Refills: 0 | Status: SHIPPED | OUTPATIENT
Start: 2021-12-14

## 2021-12-14 NOTE — TELEPHONE ENCOUNTER
Pt  was advised- verbalized understanding     would like script sent for 90 day supply to [email or web address]-Onekesha Ramirez in [facility] sent

## 2022-01-13 ENCOUNTER — TELEPHONE (OUTPATIENT)
Dept: FAMILY MEDICINE CLINIC | Facility: CLINIC | Age: 72
End: 2022-01-13

## 2022-01-13 RX ORDER — POTASSIUM CHLORIDE 750 MG/1
10 TABLET, FILM COATED, EXTENDED RELEASE ORAL
Qty: 38 TABLET | Refills: 5 | Status: SHIPPED | OUTPATIENT
Start: 2022-01-14

## 2022-01-13 NOTE — TELEPHONE ENCOUNTER
ROXANA for pt to call back    Our Sig shows she takes this three time a week- her note below says she takes it three times a day    Pt verified that was a type-- she takes this 3 times a week

## 2022-01-13 NOTE — TELEPHONE ENCOUNTER
Pt would like a refill on her potassium 10 MEQ. PT has one pill left for tomorrow. She takes this 3 times a week. Please send to [facility] in [place]. Pt would like a day supply.

## 2022-02-28 RX ORDER — ESCITALOPRAM OXALATE 10 MG/1
TABLET ORAL
Qty: 90 TABLET | Refills: 0 | Status: SHIPPED | OUTPATIENT
Start: 2022-02-28

## 2022-02-28 NOTE — TELEPHONE ENCOUNTER
escitalopram 10 MG Oral Tab    Pt is needing to have a 90 day supply       Pt would like refill sent to   [address], [address] [phone], [phone]

## 2022-03-09 RX ORDER — ATORVASTATIN CALCIUM 20 MG/1
20 TABLET, FILM COATED ORAL NIGHTLY
Qty: 90 TABLET | Refills: 0 | Status: SHIPPED
Start: 2022-03-09

## 2022-03-09 NOTE — TELEPHONE ENCOUNTER
Mackenzie Donis from [address] calling for a prescription refill for pt     atorvastatin 20 MG Oral Tab    [facility], [address] [facility] [phone], [phone]    Thank you

## 2022-03-31 RX ORDER — ESCITALOPRAM OXALATE 10 MG/1
TABLET ORAL
Qty: 90 TABLET | Refills: 0 | OUTPATIENT
Start: 2022-03-31

## 2022-03-31 RX ORDER — INSULIN ASPART 100 [IU]/ML
INJECTION, SOLUTION INTRAVENOUS; SUBCUTANEOUS
Qty: 75 ML | Refills: 6 | Status: SHIPPED | OUTPATIENT
Start: 2022-03-31

## 2022-03-31 NOTE — TELEPHONE ENCOUNTER
LOV: 12/9/21   Last Refill: 2/28/22 #90    Medication refused because it was filled for #90 just last month

## 2022-03-31 NOTE — TELEPHONE ENCOUNTER
Insulin Aspart Pen (NOVOLOG FLEXPEN) 100 UNIT/ML Subcutaneous Solution Pen-injector [[id number]] (Order [id number])      Pt would like refill sent to  [facility], [address] [phone], [phone]

## 2022-04-01 RX ORDER — ESCITALOPRAM OXALATE 10 MG/1
TABLET ORAL
Qty: 90 TABLET | Refills: 0 | OUTPATIENT
Start: 2022-04-01

## 2022-04-04 ENCOUNTER — TELEPHONE (OUTPATIENT)
Dept: FAMILY MEDICINE CLINIC | Facility: CLINIC | Age: 72
End: 2022-04-04

## 2022-04-04 RX ORDER — ATENOLOL AND CHLORTHALIDONE 100; 25 MG/1; MG/1
1 TABLET ORAL
Qty: 90 TABLET | Refills: 2 | Status: SHIPPED | OUTPATIENT
Start: 2022-04-04

## 2022-04-04 RX ORDER — LEVOTHYROXINE SODIUM 0.05 MG/1
50 TABLET ORAL DAILY
Qty: 90 TABLET | Refills: 2 | Status: SHIPPED | OUTPATIENT
Start: 2022-04-04 | End: 2022-07-03

## 2022-04-04 NOTE — TELEPHONE ENCOUNTER
Atenolol and Levothyroxine sent to pharmacy    Pt has Escitalopram filled 2/28/22- for #90 she should not be due for refill until end of May

## 2022-04-04 NOTE — TELEPHONE ENCOUNTER
PTS CALLED AND ADV NEEDS 90 DAY REFILLS OF:    CALLED ON Friday AND DOESN'T SEEM LIKE IT WENT THROUGH      0Levothyroxine Sodium 50 MCG Oral Tab    AND    Atenolol-Chlorthalidone 100-25 MG Oral Tab    AND    escitalopram 10 MG Oral Tab    LAKESHA YEBOAH    THANK YOU

## 2022-04-26 ENCOUNTER — TELEPHONE (OUTPATIENT)
Dept: FAMILY MEDICINE CLINIC | Facility: CLINIC | Age: 72
End: 2022-04-26

## 2022-04-26 RX ORDER — ATORVASTATIN CALCIUM 20 MG/1
20 TABLET, FILM COATED ORAL NIGHTLY
Qty: 90 TABLET | Refills: 0 | Status: SHIPPED | OUTPATIENT
Start: 2022-04-26 | End: 2022-04-26

## 2022-04-26 RX ORDER — ATORVASTATIN CALCIUM 20 MG/1
20 TABLET, FILM COATED ORAL NIGHTLY
Qty: 90 TABLET | Refills: 0 | Status: SHIPPED | OUTPATIENT
Start: 2022-04-26

## 2022-05-17 ENCOUNTER — PATIENT OUTREACH (OUTPATIENT)
Dept: CASE MANAGEMENT | Age: 72
End: 2022-05-17

## 2022-06-04 RX ORDER — RAMIPRIL 2.5 MG/1
CAPSULE ORAL
Qty: 90 CAPSULE | Refills: 2 | Status: SHIPPED | OUTPATIENT
Start: 2022-06-04

## 2022-06-04 RX ORDER — RAMIPRIL 2.5 MG/1
CAPSULE ORAL
Qty: 90 CAPSULE | Refills: 0 | OUTPATIENT
Start: 2022-06-04

## 2022-06-04 NOTE — TELEPHONE ENCOUNTER
Refill refused    Medication Quantity Refills Start End   ramipril 2.5 MG Oral Cap 90 capsule 2 6/4/2022

## 2022-06-04 NOTE — TELEPHONE ENCOUNTER
ramipril 2.5 MG Oral [address], [address] [employer] [place] [phone], [phone]       called states needs refill medication verified pharmacy     Pt not able to be seen sooner with new doctor until Wednesday and she is out of medication     Call back # [phone]    Thank you

## 2022-06-04 NOTE — TELEPHONE ENCOUNTER
Hypertension Medications Protocol Passed 06/04/2022 09:58 AM   Protocol Details  CMP or BMP in past 12 months    Last serum creatinine< 2.0    Appointment in past 6 or next 3 months     Order per protocol

## 2022-06-06 ENCOUNTER — TELEPHONE (OUTPATIENT)
Dept: FAMILY MEDICINE CLINIC | Facility: CLINIC | Age: 72
End: 2022-06-06

## 2022-06-06 NOTE — TELEPHONE ENCOUNTER
RN spoke with pharmacy they advised that script has been ready since since 6/4/2022    Pharmacist states he is going to reach out to them to let them know it is ready

## 2022-06-06 NOTE — TELEPHONE ENCOUNTER
SPOUSE CALLED AND ADV THAT HE DOUBLE CHECKED WITH THE PHARMACY AND ADV THAT THEY DO NOT HAVE SCRIPT OF :     ramipril 2.5 MG Oral Cap    ** REQUESTING 90 DAY SUPPLY, DOES HAVE AN APPOINTMENT W/NEW PCP Wednesday **    Russ

## 2022-06-06 NOTE — TELEPHONE ENCOUNTER
[address] [facility], [address] [place] [phone], [place] [address] [facility]   Phone: [phone] Fax: [phone]     PATIENT [address] REQUESTING A PHONE CALL FOR REFILL. PATIENT WAS INFORMED THAT MEDICATION WAS SENT, BUT [facility] PH[facility]. REFILL FOR RAMIPRIL.

## 2022-07-02 NOTE — TELEPHONE ENCOUNTER
Last office visit-12/9/21 Medicare Annual    Last refill-4/26/22 #90+0    Last labs-12/9/21  Lipid to be rechecked 4/2022      No future appointments.

## 2022-07-03 RX ORDER — ATORVASTATIN CALCIUM 20 MG/1
TABLET, FILM COATED ORAL
Qty: 90 TABLET | Refills: 0 | Status: SHIPPED | OUTPATIENT
Start: 2022-07-03

## 2022-07-27 ENCOUNTER — TELEPHONE (OUTPATIENT)
Dept: FAMILY MEDICINE CLINIC | Facility: CLINIC | Age: 72
End: 2022-07-27

## 2022-07-27 NOTE — TELEPHONE ENCOUNTER
SPOKE WITH SPOUSE AND ADV THAT THEY HAVE MOVED OUT OF STATE AND HAVE NEW PCP - PLEASE REMOVE  [address] [facility]

## 2022-09-01 NOTE — TELEPHONE ENCOUNTER
Diabetic Medication Protocol Failed 09/01/2022 09:03 AM   Protocol Details  HgBA1C procedure resulted in past 6 months    Last HgBA1C < 7.5    Appointment in past 6 or next 3 months    Microalbumin procedure in past 12 months         Routing to provider per protocol. metFORMIN 500 MG Oral Tab  Last refilled on 11/30/21 for #180  with 2 rf. Last labs 12/9/21. Last seen on 12/9/21. Last A1c value was 9.9% done 12/9/2021. No future appointments. Thank you.

## 2022-12-22 NOTE — TELEPHONE ENCOUNTER
Let him know, I am having the diabetic educator check into it, this can wait til tomorrow,
Patient  notified and verbalized understanding. Do you want order entered for diabetic education?
Pt's  called wife is thinking that she would like to an in arm monitor for her diabetes. He is thinking that it might be in her arm for 15 days and then she doesn't have to stick herself everyday.  They are wanting to know what the dr thinks of that
See telephone message 8/3/20.
middle

## 2023-01-06 RX ORDER — ATENOLOL AND CHLORTHALIDONE TABLET 100; 25 MG/1; MG/1
1 TABLET ORAL
Qty: 90 TABLET | Refills: 0 | OUTPATIENT
Start: 2023-01-06

## 2023-01-06 NOTE — TELEPHONE ENCOUNTER
Hypertension Medications Protocol Failed 01/06/2023 02:14 PM   Protocol Details  CMP or BMP in past 12 months    Appointment in past 6 or next 3 months    Last serum creatinine< 2.0     Last OV:12/09/2021  Last refill:04/04/2022, 90 tabs, 2 refills    Medication pended, please sign if appropriate

## 2024-01-04 RX ORDER — INSULIN ASPART 100 [IU]/ML
INJECTION, SOLUTION INTRAVENOUS; SUBCUTANEOUS
Qty: 60 ML | Refills: 0 | OUTPATIENT
Start: 2024-01-04

## 2024-01-04 NOTE — TELEPHONE ENCOUNTER
Routing to provider per protocol.   Insulin Aspart Pen (NOVOLOG FLEXPEN) 100 UNIT/ML Subcutaneous Solution Pen-injector   Last refilled on 3/31/22 for #75ml  with 6 rf.   Last labs 12/9/21.   Last seen on 12/9/21.     No future appointments.       Thank you.

## 2024-01-09 ENCOUNTER — TELEPHONE (OUTPATIENT)
Dept: FAMILY MEDICINE CLINIC | Facility: CLINIC | Age: 74
End: 2024-01-09

## 2024-01-09 RX ORDER — INSULIN ASPART 100 [IU]/ML
INJECTION, SOLUTION INTRAVENOUS; SUBCUTANEOUS
Qty: 60 ML | Refills: 0 | OUTPATIENT
Start: 2024-01-09

## 2024-01-09 NOTE — TELEPHONE ENCOUNTER
[facility] in [place] (listed in TE) asked for refill on med. Please contact pt when refill has been sent or denied

## 2024-01-30 NOTE — TELEPHONE ENCOUNTER
Anemia resolved.  Okay to stop iron.  Still needs GI evaluation which had been on hold due to her cardiac surgery.  Referred back to Dr. Banks for esophagogastroduodenoscopy in colon.   Last OV 11/7/16, Future Appointments  Date Time Provider Lei Martini   5/11/2017 12:30 PM [facility] [place]   5/11/2017 2:15 PM [facility] MR [facility]   11/13/2017 1:00 PM DO LAUREN Herman [facility] [place]       Last rx given

## 2024-03-12 NOTE — TELEPHONE ENCOUNTER
Called the pharmacy to see if they have this medication in stock and they do      [facility] 9/30/17 #30   I called the pt and advised that Dr. Marija Hernandez filled 30 days for her but the 90 day will have to wait for Dr. Gabe Sewell- she v/u  She states that she is seeing a Sudden numbness or weakness of the face, arm, or leg, especially on one side of the body. Confusion, trouble speaking or understanding. Trouble seeing in one or both eyes. Trouble walking, dizziness, loss of balance or coordination. Severe headache.

## 2025-05-07 NOTE — TELEPHONE ENCOUNTER
LOV: 11/13/17   Last Refill: today- already sent to pharmacy    Future Appointments   Date Time Provider Lei Martini   11/14/2018  9:15 AM DO LAUREN Jordan Patient not in room at this time,,,

## (undated) NOTE — LETTER
04/24/19    Jordana Jauregui  [address] [facility]      Dear Jordana Jauregui,    To help us provide the highest quality medical care, [facility] uses a sophisticated computer system to track our patient records.  During a review

## (undated) NOTE — LETTER
12/21/19  Versa Cockayne  [address] [facility]  [place] [address]      Dear Versa Cockayne. To help us provide the highest quality medical care, [facility] uses a sophisticated computer system to track our patient records.  During a review of

## (undated) NOTE — MR AVS SNAPSHOT
[address] [facility] [address] [phone]               Thank you for choosing us for your health care visit with Sarath White MD.  We are glad to serve you and happy to provide you with this sum Your physician or the clinic staff will work with your insurance company to obtain this authorization for your ordered radiology test.    To schedule an appointment for your radiology test please call [facility] Scheduling   at [phone]. If you are confident that your benefit plan will not require a referral or authorization, such as [place] Medicaid, please feel free to schedule your appointment immediately.  However, if you are unsure about the requirements for authorization, please wait  protocol for controlled substances:  Written prescriptions      ? EFFECTIVE April 1, 2017 PATIENTS MUST  THEIR OWN NARCOTIC PRESCRIPTIONS. ? Written prescriptions must be picked up in office. ?  Please allow the office 48-72 hours to fill t Your pharmacy should also send any requests electronically to the [place] office.          Allergies as of Apr 12, 2017     Latex     Statins                 Today's Vital Signs     BP Pulse Weight             116/72 mmHg 86 303 lb            Current Me * ONETOUCH ULTRA BLUE Strp   Generic drug:  Glucose Blood   TEST BLOOD SUGAR FOUR TIMES DAILY           * Glucose Blood Strp   Tests BS three times daily.    Commonly known as:  ONETOUCH TEST           * Pen Needles 5/16\" 30G X 8 MM Misc   Patient tests 4

## (undated) NOTE — LETTER
12/17/18        Nuha Mariscal [address] [place] [address]      Dear Magen Deras,    [facility] records indicate that you have outstanding lab work and or testing that was ordered for you and has not yet been completed:  Lab Frequency Next Occurrence   MARCIO

## (undated) NOTE — LETTER
10/09/19        Ralf Mariscal [address] [address]      Dear Mansi Garrido,    [facility] records indicate that you have outstanding lab work and or testing that was ordered for you and has not yet been completed:  Lab Frequency Next Occurrence   MRI

## (undated) NOTE — LETTER
12/21/19  [address] [address]      Dear [facility]. To help us provide the highest quality medical care, [facility] uses a sophisticated computer system to track our patient records.  During a review of

## (undated) NOTE — LETTER
[address] [facility], [address]  [place] J[place] [address]  [phone]              1/7/2021        Harper Mariscal [address] [place] [address]      Dear Gretchen Carbajal

## (undated) NOTE — LETTER
[address] [facility], [facility] [address]  [phone]        08/30/17      [address] [facility]        Dear Rudy Roth      To help us provide the

## (undated) NOTE — LETTER
[address]  [address] [place] [address]  [phone]              11/3/2018        Pascual Mariscal [address] [address]      Dear [facility]

## (undated) NOTE — LETTER
12/21/19  Elisabet Posadas  [address] [facility] [place] [address]      Dear Elisabet Posadas. To help us provide the highest quality medical care, [facility] uses a sophisticated computer system to track our patient records.  During a review of

## (undated) NOTE — LETTER
Harper Donovan  [address] [facility] [address]    5/9/2019      Dear  Harper Donovan    In order to provide the highest quality care, [facility] uses a sophisticated computer system to track our patient's record

## (undated) NOTE — LETTER
06/17/19        Enciso Stagedianna [address] [address]      Dear Penelope Gonzalez,    [address] [facility] records indicate that you have outstanding lab work and or testing that was ordered for you and has not yet been completed:  Lab Frequency Next Occurrence   MARCIO